# Patient Record
Sex: MALE | Race: WHITE | NOT HISPANIC OR LATINO | Employment: OTHER | URBAN - NONMETROPOLITAN AREA
[De-identification: names, ages, dates, MRNs, and addresses within clinical notes are randomized per-mention and may not be internally consistent; named-entity substitution may affect disease eponyms.]

---

## 2019-05-24 ENCOUNTER — TRANSCRIBE ORDERS (OUTPATIENT)
Dept: ADMINISTRATIVE | Facility: HOSPITAL | Age: 54
End: 2019-05-24

## 2019-05-24 ENCOUNTER — LAB (OUTPATIENT)
Dept: LAB | Facility: HOSPITAL | Age: 54
End: 2019-05-24

## 2019-05-24 DIAGNOSIS — M25.50 MULTIPLE JOINT PAIN: ICD-10-CM

## 2019-05-24 DIAGNOSIS — M25.50 MULTIPLE JOINT PAIN: Primary | ICD-10-CM

## 2019-05-24 LAB
BASOPHILS # BLD AUTO: 0.05 10*3/MM3 (ref 0–0.2)
BASOPHILS NFR BLD AUTO: 0.7 % (ref 0–1.5)
CRP SERPL-MCNC: 1.2 MG/DL (ref 0–1)
DEPRECATED RDW RBC AUTO: 43.4 FL (ref 37–54)
EOSINOPHIL # BLD AUTO: 0.33 10*3/MM3 (ref 0–0.4)
EOSINOPHIL NFR BLD AUTO: 4.3 % (ref 0.3–6.2)
ERYTHROCYTE [DISTWIDTH] IN BLOOD BY AUTOMATED COUNT: 12.9 % (ref 12.3–15.4)
ERYTHROCYTE [SEDIMENTATION RATE] IN BLOOD: 20 MM/HR (ref 0–15)
HCT VFR BLD AUTO: 42 % (ref 37.5–51)
HGB BLD-MCNC: 14.1 G/DL (ref 13–17.7)
IMM GRANULOCYTES # BLD AUTO: 0.01 10*3/MM3 (ref 0–0.05)
IMM GRANULOCYTES NFR BLD AUTO: 0.1 % (ref 0–0.5)
LYMPHOCYTES # BLD AUTO: 2.57 10*3/MM3 (ref 0.7–3.1)
LYMPHOCYTES NFR BLD AUTO: 33.5 % (ref 19.6–45.3)
MCH RBC QN AUTO: 30.9 PG (ref 26.6–33)
MCHC RBC AUTO-ENTMCNC: 33.6 G/DL (ref 31.5–35.7)
MCV RBC AUTO: 91.9 FL (ref 79–97)
MONOCYTES # BLD AUTO: 0.81 10*3/MM3 (ref 0.1–0.9)
MONOCYTES NFR BLD AUTO: 10.6 % (ref 5–12)
NEUTROPHILS # BLD AUTO: 3.9 10*3/MM3 (ref 1.7–7)
NEUTROPHILS NFR BLD AUTO: 50.8 % (ref 42.7–76)
NRBC BLD AUTO-RTO: 0 /100 WBC (ref 0–0.2)
PLATELET # BLD AUTO: 283 10*3/MM3 (ref 140–450)
PMV BLD AUTO: 9.2 FL (ref 6–12)
RBC # BLD AUTO: 4.57 10*6/MM3 (ref 4.14–5.8)
RHEUMATOID FACT SERPL-ACNC: NEGATIVE [IU]/ML
URATE SERPL-MCNC: 6.7 MG/DL (ref 2.5–8.5)
WBC NRBC COR # BLD: 7.67 10*3/MM3 (ref 3.4–10.8)

## 2019-05-24 PROCEDURE — 81374 HLA I TYPING 1 ANTIGEN LR: CPT

## 2019-05-24 PROCEDURE — 86235 NUCLEAR ANTIGEN ANTIBODY: CPT

## 2019-05-24 PROCEDURE — 86140 C-REACTIVE PROTEIN: CPT

## 2019-05-24 PROCEDURE — 86225 DNA ANTIBODY NATIVE: CPT

## 2019-05-24 PROCEDURE — 84550 ASSAY OF BLOOD/URIC ACID: CPT

## 2019-05-24 PROCEDURE — 85651 RBC SED RATE NONAUTOMATED: CPT

## 2019-05-24 PROCEDURE — 86618 LYME DISEASE ANTIBODY: CPT

## 2019-05-24 PROCEDURE — 86200 CCP ANTIBODY: CPT

## 2019-05-24 PROCEDURE — 86430 RHEUMATOID FACTOR TEST QUAL: CPT

## 2019-05-24 PROCEDURE — 85025 COMPLETE CBC W/AUTO DIFF WBC: CPT

## 2019-05-24 PROCEDURE — 36415 COLL VENOUS BLD VENIPUNCTURE: CPT

## 2019-05-25 LAB — B BURGDOR IGG+IGM SER-ACNC: <0.91 ISR (ref 0–0.9)

## 2019-05-27 LAB — CCP IGA+IGG SERPL IA-ACNC: 5 UNITS (ref 0–19)

## 2019-05-28 LAB
CENTROMERE B AB SER-ACNC: <0.2 AI (ref 0–0.9)
CHROMATIN AB SERPL-ACNC: <0.2 AI (ref 0–0.9)
DSDNA AB SER-ACNC: <1 IU/ML (ref 0–9)
ENA JO1 AB SER-ACNC: <0.2 AI (ref 0–0.9)
ENA RNP AB SER-ACNC: <0.2 AI (ref 0–0.9)
ENA SCL70 AB SER-ACNC: 0.2 AI (ref 0–0.9)
ENA SM AB SER-ACNC: <0.2 AI (ref 0–0.9)
ENA SS-A AB SER-ACNC: <0.2 AI (ref 0–0.9)
ENA SS-B AB SER-ACNC: <0.2 AI (ref 0–0.9)
Lab: NORMAL

## 2019-05-31 LAB — HLA-B27 QL NAA+PROBE: NEGATIVE

## 2019-07-03 ENCOUNTER — LAB (OUTPATIENT)
Dept: LAB | Facility: HOSPITAL | Age: 54
End: 2019-07-03

## 2019-07-03 ENCOUNTER — OFFICE VISIT (OUTPATIENT)
Dept: PULMONOLOGY | Facility: CLINIC | Age: 54
End: 2019-07-03

## 2019-07-03 VITALS
BODY MASS INDEX: 39.86 KG/M2 | DIASTOLIC BLOOD PRESSURE: 82 MMHG | HEART RATE: 86 BPM | OXYGEN SATURATION: 92 % | WEIGHT: 263 LBS | HEIGHT: 68 IN | SYSTOLIC BLOOD PRESSURE: 140 MMHG | RESPIRATION RATE: 18 BRPM

## 2019-07-03 DIAGNOSIS — J30.89 OTHER ALLERGIC RHINITIS: ICD-10-CM

## 2019-07-03 DIAGNOSIS — G47.33 OBSTRUCTIVE SLEEP APNEA: ICD-10-CM

## 2019-07-03 DIAGNOSIS — R06.83 SNORING: ICD-10-CM

## 2019-07-03 DIAGNOSIS — J44.9 CHRONIC OBSTRUCTIVE PULMONARY DISEASE, UNSPECIFIED COPD TYPE (HCC): ICD-10-CM

## 2019-07-03 DIAGNOSIS — R06.02 SOB (SHORTNESS OF BREATH): Primary | ICD-10-CM

## 2019-07-03 DIAGNOSIS — G47.19 EXCESSIVE DAYTIME SLEEPINESS: ICD-10-CM

## 2019-07-03 DIAGNOSIS — E66.01 MORBID OBESITY, UNSPECIFIED OBESITY TYPE (HCC): ICD-10-CM

## 2019-07-03 DIAGNOSIS — J44.9 ASTHMA WITH COPD (CHRONIC OBSTRUCTIVE PULMONARY DISEASE) (HCC): ICD-10-CM

## 2019-07-03 DIAGNOSIS — R06.02 SHORTNESS OF BREATH: Primary | ICD-10-CM

## 2019-07-03 DIAGNOSIS — J41.0 CHRONIC BRONCHITIS, SIMPLE (HCC): ICD-10-CM

## 2019-07-03 DIAGNOSIS — Z87.891 PERSONAL HISTORY OF TOBACCO USE, PRESENTING HAZARDS TO HEALTH: ICD-10-CM

## 2019-07-03 PROCEDURE — 86003 ALLG SPEC IGE CRUDE XTRC EA: CPT

## 2019-07-03 PROCEDURE — 94726 PLETHYSMOGRAPHY LUNG VOLUMES: CPT | Performed by: INTERNAL MEDICINE

## 2019-07-03 PROCEDURE — 82785 ASSAY OF IGE: CPT

## 2019-07-03 PROCEDURE — 94729 DIFFUSING CAPACITY: CPT | Performed by: INTERNAL MEDICINE

## 2019-07-03 PROCEDURE — 99245 OFF/OP CONSLTJ NEW/EST HI 55: CPT | Performed by: INTERNAL MEDICINE

## 2019-07-03 PROCEDURE — 36415 COLL VENOUS BLD VENIPUNCTURE: CPT

## 2019-07-03 PROCEDURE — 94060 EVALUATION OF WHEEZING: CPT | Performed by: INTERNAL MEDICINE

## 2019-07-03 PROCEDURE — 95012 NITRIC OXIDE EXP GAS DETER: CPT | Performed by: INTERNAL MEDICINE

## 2019-07-03 RX ORDER — METHYLPREDNISOLONE 4 MG/1
TABLET ORAL
COMMUNITY
Start: 2019-06-28 | End: 2021-03-01

## 2019-07-03 RX ORDER — LOSARTAN POTASSIUM AND HYDROCHLOROTHIAZIDE 25; 100 MG/1; MG/1
TABLET ORAL
COMMUNITY
Start: 2019-06-17 | End: 2021-11-01

## 2019-07-03 RX ORDER — ZOSTER VACCINE RECOMBINANT, ADJUVANTED 50 MCG/0.5
KIT INTRAMUSCULAR
Status: ON HOLD | COMMUNITY
Start: 2019-06-19 | End: 2021-11-03

## 2019-07-03 RX ORDER — HEPATITIS A VACCINE 1440 [IU]/ML
INJECTION, SUSPENSION INTRAMUSCULAR
Status: ON HOLD | COMMUNITY
Start: 2019-06-19 | End: 2021-11-03

## 2019-07-03 RX ORDER — FLUTICASONE PROPIONATE 50 MCG
SPRAY, SUSPENSION (ML) NASAL
COMMUNITY
Start: 2019-06-17 | End: 2019-10-15 | Stop reason: SDUPTHER

## 2019-07-03 RX ORDER — INDOMETHACIN 50 MG/1
50 CAPSULE ORAL
COMMUNITY
Start: 2019-06-28 | End: 2021-05-11

## 2019-07-03 RX ORDER — AMLODIPINE BESYLATE 10 MG/1
10 TABLET ORAL DAILY
COMMUNITY
Start: 2019-06-17

## 2019-07-03 RX ORDER — ATORVASTATIN CALCIUM 40 MG/1
TABLET, FILM COATED ORAL
COMMUNITY
Start: 2019-06-20 | End: 2021-05-11

## 2019-07-03 RX ORDER — ALBUTEROL SULFATE 90 UG/1
AEROSOL, METERED RESPIRATORY (INHALATION)
COMMUNITY
Start: 2019-05-17 | End: 2019-10-15 | Stop reason: SDUPTHER

## 2019-07-03 RX ORDER — DICLOFENAC SODIUM 75 MG/1
TABLET, DELAYED RELEASE ORAL
COMMUNITY
Start: 2019-06-17 | End: 2021-03-01 | Stop reason: SDDI

## 2019-07-03 RX ORDER — ASPIRIN 81 MG/1
TABLET ORAL
COMMUNITY
Start: 2019-06-17 | End: 2021-05-11

## 2019-07-03 NOTE — PROGRESS NOTES
"CONSULT NOTE    Requested by:   Margarita Parra*   Margarita Parra MD      Chief Complaint   Patient presents with   • Consult   • COPD   • Shortness of Breath       Subjective:  Darci Vargas is a 53 y.o. male.     History of Present Illness   Patient comes in today for evaluation of shortness of breath. Patient says that for the past few years, he has been noticing that his exercise tolerance has been declining. The patient has had days when walking any significant distance is difficult to do without stopping in the middle, to catch his breath.     Patient also complains of some cough and phlegm production that occurs on most mornings out of the week, for most weeks out of the month, for the past few years. Patient used to smoke 1-2 pack per day for the past 38 years and quit smoking in January 2019.     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     He also worsens when weather changes or when it is hot and humid.     He was admitted to [facility] in January for pneumonia.     He says that despite using Breo and Incruse, he has occasional \"flare up\". He also uses Ventolin 4-5 times per week or so.    His mother also had asthma.     Upon questioning, he is complaining of sleep disturbance. Patient says that for the past few years, he has had trouble with snoring. Patient has also been told that he sometimes quits breathing at night.       Patient says that he feels tired in the morning after waking up. he is also complaining of occasionally feeling sleepy while watching TV and sometimes while reading a book.    he is not complaining of occasional headaches.    Patient's sleep schedule was reviewed. he drinks 4-5 cups/cans of caffeinated drinks per day.     he does have a family history of ALLEGRA, in his father.     Patient is under management for hypertension.     Patient says that he has been using the prescribed nasal sprays on a regular basis but continues to have worsening " "nasal congestion as well as occasional runny nose.    The following portions of the patient's history were reviewed and updated as appropriate: allergies, current medications, past family history, past medical history, past social history and past surgical history.    Review of Systems   Constitutional: Positive for fatigue. Negative for chills and fever.   HENT: Positive for rhinorrhea, sinus pressure and sneezing. Negative for sore throat.    Respiratory: Positive for cough, shortness of breath and wheezing. Negative for chest tightness.    Cardiovascular: Positive for leg swelling. Negative for chest pain and palpitations.   Psychiatric/Behavioral: Positive for sleep disturbance.   All other systems reviewed and are negative.      Past Medical History:   Diagnosis Date   • COPD (chronic obstructive pulmonary disease) (CMS/[facility])    • High cholesterol    • Hypertension    • Pneumonia    • Tobacco abuse        Social History     Tobacco Use   • Smoking status: Former Smoker     Packs/day: 2.00     Types: Cigarettes   • Tobacco comment: started smoking at 15 years of age    Substance Use Topics   • Alcohol use: No     Frequency: Never         Objective:  Visit Vitals  /82   Pulse 86   Resp 18   Ht 172.7 cm (68\")   Wt 119 kg (263 lb)   SpO2 92%   BMI 39.99 kg/m²       Physical Exam   Constitutional: He is oriented to person, place, and time. He appears well-developed and well-nourished.   HENT:   Head: Atraumatic.   Crowded Oropharynx.    Eyes: EOM are normal. Pupils are equal, round, and reactive to light.   Neck: No JVD present. No tracheal deviation present. No thyromegaly present.   Increased adipose tissue.    Cardiovascular: Normal rate and regular rhythm.   Pulmonary/Chest: Effort normal and breath sounds normal. No respiratory distress. He has no wheezes.   Somewhat hyperresonant to percussion.  Mild scattered wheezing noted.   Musculoskeletal: Normal range of motion. He exhibits no edema.   Gait was " normal.   Neurological: He is alert and oriented to person, place, and time.   Skin: Skin is warm and dry.   Psychiatric: He has a normal mood and affect. His behavior is normal.   Vitals reviewed.      Assessment/Plan:  Darci was seen today for consult, copd and shortness of breath.    Diagnoses and all orders for this visit:    Shortness of breath    Chronic obstructive pulmonary disease, unspecified COPD type (CMS/[facility])    Chronic bronchitis, simple (CMS/[facility])    Personal history of tobacco use, presenting hazards to health    Morbid obesity, unspecified obesity type (CMS/[facility])    Snoring    Obstructive sleep apnea    Excessive daytime sleepiness    Asthma with COPD (chronic obstructive pulmonary disease) (CMS/[facility])        Return in about 10 weeks (around 9/11/2019) for Recheck, Labs, Give pt sleep questionairre, Sleep study, For Rebecca.    DISCUSSION(if any):  I have reviewed the patient's imaging studies and shared them with him. Showed patchy bibasilar opacities.    Last CXRay from April 2019 showed NAPD.    I have also reviewed his last primary care provider's office note that mentions shortness of breath and recent COPD exacerbation secondary to rhinovirus.  He was started on Breo and Incruse, as per primary care provider's note.     ===========================  ===========================    FeNO level was 21 today.    Peak flow was 250 LPM today.    PFTs were reviewed.  Severe COPD noted.    Laboratory workup was also reviewed which showed   Lab Results   Component Value Date    EOSABS 0.33 05/24/2019      ===========================  ===========================    Orders as above.    Based on his symptoms it appears that he has asthma with COPD syndrome.  This may have at least some impact on his management in the future.    He was advised to use his nasal spray twice a day.    IgE/RAST panel has been ordered.     Patient was given education and demonstration on how to use the medicine.     Side effects, of  prescribed medicines, discussed.    Patient was also instructed on compliance and adherence with instructions.     I have discussed the need to stay quit from smoking.    Patient was given reading material, as appropriate.     Patient was asked to call with any concerns.     Patient will be followed clinically to assess for response to treatment and further recommendations will be made, based on response.    Sleep questionnaire was provided to the patient    The pathophysiology of sleep apnea was discussed, with him.     We will encourage him to schedule the sleep study soon.     The patient will definitely need to be considered for an in lab study due to COPD among other reasons.  It should be noted that a home sleep study is likely to underestimate the true AHI and is unable to assess sleep stages and abnormal sleep behaviors etc. The patient has understood.    The patient is agreeable to try CPAP/BiPAP, if needed.     Patient was educated on good sleep hygiene measures and voiced understanding of the same.     Patient was given reading material regarding sleep apnea.      Dictated utilizing Dragon dictation.    This document was electronically signed by Jade Contreras MD on 07/03/19 at 10:01 AM

## 2019-07-07 LAB
A ALTERNATA IGE QN: <0.1 KU/L
A FUMIGATUS IGE QN: <0.1 KU/L
AMER ROACH IGE QN: <0.1 KU/L
BAHIA GRASS IGE QN: <0.1 KU/L
BERMUDA GRASS IGE QN: <0.1 KU/L
BOXELDER IGE QN: <0.1 KU/L
C HERBARUM IGE QN: <0.1 KU/L
CAT DANDER IGG QN: <0.1 KU/L
CMN PIGWEED IGE QN: <0.1 KU/L
COMMON RAGWEED IGE QN: <0.1 KU/L
CONV CLASS DESCRIPTION: NORMAL
D FARINAE IGE QN: <0.1 KU/L
D PTERONYSS IGE QN: <0.1 KU/L
DOG DANDER IGE QN: <0.1 KU/L
ENGL PLANTAIN IGE QN: <0.1 KU/L
HAZELNUT POLN IGE QN: <0.1 KU/L
JOHNSON GRASS IGE QN: <0.1 KU/L
KENT BLUE GRASS IGE QN: <0.1 KU/L
M RACEMOSUS IGE QN: <0.1 KU/L
MT JUNIPER IGE QN: <0.1 KU/L
MUGWORT IGE QN: <0.1 KU/L
NETTLE IGE QN: <0.1 KU/L
P NOTATUM IGE QN: <0.1 KU/L
S BOTRYOSUM IGE QN: <0.1 KU/L
SHEEP SORREL IGE QN: <0.1 KU/L
SWEET GUM IGE QN: <0.1 KU/L
T011-IGE MAPLE LEAF SYCAMORE: <0.1 KU/L
TOTAL IGE SMQN RAST: 12 IU/ML (ref 6–495)
WHITE ELM IGE QN: <0.1 KU/L
WHITE HICKORY IGE QN: <0.1 KU/L
WHITE MULBERRY IGE QN: <0.1 KU/L
WHITE OAK IGE QN: <0.1 KU/L

## 2019-07-19 ENCOUNTER — HOSPITAL ENCOUNTER (OUTPATIENT)
Dept: SLEEP MEDICINE | Facility: HOSPITAL | Age: 54
Discharge: HOME | End: 2019-07-19
Admitting: INTERNAL MEDICINE

## 2019-07-19 DIAGNOSIS — R06.83 SNORING: ICD-10-CM

## 2019-07-19 DIAGNOSIS — G47.19 EXCESSIVE DAYTIME SLEEPINESS: ICD-10-CM

## 2019-07-19 DIAGNOSIS — J44.9 CHRONIC OBSTRUCTIVE PULMONARY DISEASE, UNSPECIFIED COPD TYPE (HCC): ICD-10-CM

## 2019-07-19 DIAGNOSIS — G47.33 OBSTRUCTIVE SLEEP APNEA: ICD-10-CM

## 2019-07-19 DIAGNOSIS — E66.01 MORBID OBESITY, UNSPECIFIED OBESITY TYPE (HCC): ICD-10-CM

## 2019-07-19 PROCEDURE — 95810 POLYSOM 6/> YRS 4/> PARAM: CPT

## 2019-07-19 PROCEDURE — 95810 POLYSOM 6/> YRS 4/> PARAM: CPT | Performed by: INTERNAL MEDICINE

## 2019-10-15 ENCOUNTER — OFFICE VISIT (OUTPATIENT)
Dept: PULMONOLOGY | Facility: CLINIC | Age: 54
End: 2019-10-15

## 2019-10-15 VITALS
HEIGHT: 68 IN | SYSTOLIC BLOOD PRESSURE: 144 MMHG | HEART RATE: 72 BPM | BODY MASS INDEX: 37.44 KG/M2 | DIASTOLIC BLOOD PRESSURE: 96 MMHG | WEIGHT: 247 LBS | RESPIRATION RATE: 20 BRPM | OXYGEN SATURATION: 91 %

## 2019-10-15 DIAGNOSIS — E66.01 MORBID OBESITY, UNSPECIFIED OBESITY TYPE (HCC): ICD-10-CM

## 2019-10-15 DIAGNOSIS — J44.9 CHRONIC OBSTRUCTIVE PULMONARY DISEASE, UNSPECIFIED COPD TYPE (HCC): ICD-10-CM

## 2019-10-15 DIAGNOSIS — R06.02 SHORTNESS OF BREATH: Primary | ICD-10-CM

## 2019-10-15 DIAGNOSIS — G47.33 OBSTRUCTIVE SLEEP APNEA: ICD-10-CM

## 2019-10-15 PROCEDURE — 99214 OFFICE O/P EST MOD 30 MIN: CPT | Performed by: NURSE PRACTITIONER

## 2019-10-15 RX ORDER — FOLIC ACID 1 MG/1
1 TABLET ORAL DAILY
COMMUNITY
End: 2021-05-11

## 2019-10-15 RX ORDER — PREDNISONE 10 MG/1
10 TABLET ORAL DAILY
COMMUNITY
End: 2021-05-11

## 2019-10-15 RX ORDER — FLUTICASONE PROPIONATE 50 MCG
2 SPRAY, SUSPENSION (ML) NASAL DAILY
Qty: 1 BOTTLE | Refills: 5 | Status: SHIPPED | OUTPATIENT
Start: 2019-10-15 | End: 2020-06-19

## 2019-10-15 RX ORDER — ALBUTEROL SULFATE 90 UG/1
2 AEROSOL, METERED RESPIRATORY (INHALATION) EVERY 6 HOURS PRN
Qty: 1 INHALER | Refills: 5 | Status: SHIPPED | OUTPATIENT
Start: 2019-10-15 | End: 2020-08-10 | Stop reason: SDUPTHER

## 2019-10-15 NOTE — PROGRESS NOTES
"Chief Complaint   Patient presents with   • Follow-up   • Shortness of Breath         Subjective   Darci Vargas is a 54 y.o. male.     History of Present Illness   The patient comes in today for follow-up of COPD and obstructive sleep apnea.    He is using Breo and Incruse on a daily basis.  He uses the rescue inhaler 1-2 times per week but states he is using it less now than he was previously.    He denies any respiratory illness.    He uses oxygen at 2 L/min nearly continuously. He has been on oxygen since January 2019 when he was discharged from the hospital.      I reviewed his sleep study and discussed the results with him.  His sleep study revealed moderate obstructive sleep apnea with an apnea hypopnea index of 28/h.  The apnea hypopnea index was worse in REM sleep.  Unfortunately the patient refused CPAP when he was contacted regarding set up.    The patient states he knows himself best and he knows he will not use this machine.  He states he cannot stand anything on his face.    He takes prednisone daily due to arthritis.    He uses Flonase on a daily basis.    He quit smoking January 2019.    The following portions of the patient's history were reviewed and updated as appropriate: allergies, current medications, past family history, past medical history, past social history and past surgical history.    Review of Systems   Constitutional: Positive for chills and fever. Negative for fatigue.   HENT: Positive for rhinorrhea, sinus pressure and sinus pain. Negative for sneezing and sore throat.    Respiratory: Negative for cough, shortness of breath and wheezing.    Psychiatric/Behavioral: Negative for sleep disturbance.       Objective   Visit Vitals  /96   Pulse 72   Resp 20   Ht 172.7 cm (68\")   Wt 112 kg (247 lb)   SpO2 91% Comment: resting on room air   BMI 37.56 kg/m²     Physical Exam   Constitutional: He is oriented to person, place, and time.   HENT:   Head: Normocephalic and atraumatic. "   Crowded oropharynx.    Eyes: EOM are normal.   Neck: Neck supple.   Cardiovascular: Normal rate and regular rhythm.   Pulmonary/Chest: Effort normal. No respiratory distress.   Somewhat decreased A/E without wheezing noted.   Musculoskeletal: He exhibits no edema.   Gait normal.   Neurological: He is alert and oriented to person, place, and time.   Psychiatric: He has a normal mood and affect.   Vitals reviewed.          Assessment/Plan   Darci was seen today for follow-up and shortness of breath.    Diagnoses and all orders for this visit:    Shortness of breath    Chronic obstructive pulmonary disease, unspecified COPD type (CMS/HCC)    Morbid obesity, unspecified obesity type (CMS/HCC)    Obstructive sleep apnea    Other orders  -     albuterol sulfate  (90 Base) MCG/ACT inhaler; Inhale 2 puffs Every 6 (Six) Hours As Needed for Wheezing.  -     BREO ELLIPTA 200-25 MCG/INH inhaler; Inhale 1 puff Daily.  -     fluticasone (FLONASE) 50 MCG/ACT nasal spray; 2 sprays into the nostril(s) as directed by provider Daily.  -     INCRUSE ELLIPTA 62.5 MCG/INH aerosol powder ; Inhale 1 puff Daily.           Return in about 5 months (around 3/15/2020) for Recheck, For Dr. Contreras.    DISCUSSION (if any):  I reviewed his labs.  RAST was normal.  IgE 12.  Absolute eosinophils were 330 in May 2019.    No change to the current medications has been made.  He should continue using Breo, Incruse, and the rescue medication as needed.    Compliance with medications stressed.     Side effects of prescribed medications discussed with the patient    The patient belongs to the risk group for which lung cancer screening has been recommended.  This will need to be ordered at his next visit due to his long-standing smoking history.    We have discussed the risks of not treating obstructive sleep apnea including increased risk of heart attack and stroke.  The patient verbalizes these risks and states he will continue to use the oxygen  at night but he refuses CPAP therapy altogether.      Dictated utilizing Dragon dictation.    This document was electronically signed by ALVAREZ Guerrero October 15, 2019  10:36 AM

## 2020-05-28 ENCOUNTER — OFFICE VISIT (OUTPATIENT)
Dept: PULMONOLOGY | Facility: CLINIC | Age: 55
End: 2020-05-28

## 2020-05-28 VITALS
OXYGEN SATURATION: 96 % | RESPIRATION RATE: 18 BRPM | BODY MASS INDEX: 41.37 KG/M2 | SYSTOLIC BLOOD PRESSURE: 122 MMHG | HEART RATE: 82 BPM | TEMPERATURE: 98 F | WEIGHT: 273 LBS | HEIGHT: 68 IN | DIASTOLIC BLOOD PRESSURE: 82 MMHG

## 2020-05-28 DIAGNOSIS — J44.9 CHRONIC OBSTRUCTIVE PULMONARY DISEASE, UNSPECIFIED COPD TYPE (HCC): ICD-10-CM

## 2020-05-28 DIAGNOSIS — Z87.891 PERSONAL HISTORY OF TOBACCO USE, PRESENTING HAZARDS TO HEALTH: ICD-10-CM

## 2020-05-28 DIAGNOSIS — E66.01 MORBID OBESITY, UNSPECIFIED OBESITY TYPE (HCC): ICD-10-CM

## 2020-05-28 DIAGNOSIS — G47.33 OBSTRUCTIVE SLEEP APNEA: ICD-10-CM

## 2020-05-28 DIAGNOSIS — J44.9 ASTHMA WITH COPD (CHRONIC OBSTRUCTIVE PULMONARY DISEASE) (HCC): ICD-10-CM

## 2020-05-28 DIAGNOSIS — R06.02 SHORTNESS OF BREATH: Primary | ICD-10-CM

## 2020-05-28 PROCEDURE — 99214 OFFICE O/P EST MOD 30 MIN: CPT | Performed by: INTERNAL MEDICINE

## 2020-05-28 RX ORDER — LEFLUNOMIDE 10 MG/1
TABLET ORAL
COMMUNITY
Start: 2020-05-18 | End: 2021-03-01 | Stop reason: SINTOL

## 2020-06-04 ENCOUNTER — APPOINTMENT (OUTPATIENT)
Dept: CT IMAGING | Facility: HOSPITAL | Age: 55
End: 2020-06-04

## 2020-06-12 ENCOUNTER — HOSPITAL ENCOUNTER (OUTPATIENT)
Dept: CT IMAGING | Facility: HOSPITAL | Age: 55
Discharge: HOME | End: 2020-06-12
Admitting: INTERNAL MEDICINE

## 2020-06-12 DIAGNOSIS — Z87.891 PERSONAL HISTORY OF TOBACCO USE, PRESENTING HAZARDS TO HEALTH: ICD-10-CM

## 2020-06-12 PROCEDURE — G0297 LDCT FOR LUNG CA SCREEN: HCPCS

## 2020-06-19 RX ORDER — FLUTICASONE PROPIONATE 50 MCG
SPRAY, SUSPENSION (ML) NASAL
Qty: 16 G | Refills: 5 | Status: SHIPPED | OUTPATIENT
Start: 2020-06-19

## 2020-08-07 ENCOUNTER — TELEPHONE (OUTPATIENT)
Dept: PULMONOLOGY | Facility: CLINIC | Age: 55
End: 2020-08-07

## 2020-08-10 ENCOUNTER — TRANSCRIBE ORDERS (OUTPATIENT)
Dept: LAB | Facility: HOSPITAL | Age: 55
End: 2020-08-10

## 2020-08-10 ENCOUNTER — LAB (OUTPATIENT)
Dept: LAB | Facility: HOSPITAL | Age: 55
End: 2020-08-10

## 2020-08-10 ENCOUNTER — OFFICE VISIT (OUTPATIENT)
Dept: PULMONOLOGY | Facility: CLINIC | Age: 55
End: 2020-08-10

## 2020-08-10 VITALS
OXYGEN SATURATION: 96 % | SYSTOLIC BLOOD PRESSURE: 124 MMHG | TEMPERATURE: 97.3 F | HEART RATE: 69 BPM | DIASTOLIC BLOOD PRESSURE: 74 MMHG | HEIGHT: 68 IN | RESPIRATION RATE: 16 BRPM | WEIGHT: 279 LBS | BODY MASS INDEX: 42.28 KG/M2

## 2020-08-10 DIAGNOSIS — J44.9 CHRONIC OBSTRUCTIVE PULMONARY DISEASE, UNSPECIFIED COPD TYPE (HCC): ICD-10-CM

## 2020-08-10 DIAGNOSIS — M06.09 RHEUMATOID ARTHRITIS OF MULTIPLE SITES WITHOUT RHEUMATOID FACTOR (HCC): ICD-10-CM

## 2020-08-10 DIAGNOSIS — G47.33 OBSTRUCTIVE SLEEP APNEA: ICD-10-CM

## 2020-08-10 DIAGNOSIS — R06.02 SHORTNESS OF BREATH: Primary | ICD-10-CM

## 2020-08-10 DIAGNOSIS — E66.01 MORBID OBESITY, UNSPECIFIED OBESITY TYPE (HCC): ICD-10-CM

## 2020-08-10 LAB
ALBUMIN SERPL-MCNC: 4.1 G/DL (ref 3.5–5.2)
ALBUMIN/GLOB SERPL: 1.4 G/DL
ALP SERPL-CCNC: 85 U/L (ref 39–117)
ALT SERPL W P-5'-P-CCNC: 22 U/L (ref 1–41)
ANION GAP SERPL CALCULATED.3IONS-SCNC: 7.7 MMOL/L (ref 5–15)
AST SERPL-CCNC: 21 U/L (ref 1–40)
BASOPHILS # BLD AUTO: 0.04 10*3/MM3 (ref 0–0.2)
BASOPHILS NFR BLD AUTO: 0.4 % (ref 0–1.5)
BILIRUB SERPL-MCNC: 0.7 MG/DL (ref 0–1.2)
BUN SERPL-MCNC: 13 MG/DL (ref 6–20)
BUN/CREAT SERPL: 14.8 (ref 7–25)
CALCIUM SPEC-SCNC: 9.7 MG/DL (ref 8.6–10.5)
CHLORIDE SERPL-SCNC: 92 MMOL/L (ref 98–107)
CO2 SERPL-SCNC: 30.3 MMOL/L (ref 22–29)
CREAT SERPL-MCNC: 0.88 MG/DL (ref 0.76–1.27)
DEPRECATED RDW RBC AUTO: 48.1 FL (ref 37–54)
EOSINOPHIL # BLD AUTO: 0.18 10*3/MM3 (ref 0–0.4)
EOSINOPHIL NFR BLD AUTO: 2 % (ref 0.3–6.2)
ERYTHROCYTE [DISTWIDTH] IN BLOOD BY AUTOMATED COUNT: 13.6 % (ref 12.3–15.4)
GFR SERPL CREATININE-BSD FRML MDRD: 90 ML/MIN/1.73
GLOBULIN UR ELPH-MCNC: 2.9 GM/DL
GLUCOSE SERPL-MCNC: 82 MG/DL (ref 65–99)
HCT VFR BLD AUTO: 45.5 % (ref 37.5–51)
HGB BLD-MCNC: 15 G/DL (ref 13–17.7)
IMM GRANULOCYTES # BLD AUTO: 0.02 10*3/MM3 (ref 0–0.05)
IMM GRANULOCYTES NFR BLD AUTO: 0.2 % (ref 0–0.5)
LYMPHOCYTES # BLD AUTO: 2.19 10*3/MM3 (ref 0.7–3.1)
LYMPHOCYTES NFR BLD AUTO: 23.9 % (ref 19.6–45.3)
MCH RBC QN AUTO: 31.9 PG (ref 26.6–33)
MCHC RBC AUTO-ENTMCNC: 33 G/DL (ref 31.5–35.7)
MCV RBC AUTO: 96.8 FL (ref 79–97)
MONOCYTES # BLD AUTO: 0.86 10*3/MM3 (ref 0.1–0.9)
MONOCYTES NFR BLD AUTO: 9.4 % (ref 5–12)
NEUTROPHILS NFR BLD AUTO: 5.86 10*3/MM3 (ref 1.7–7)
NEUTROPHILS NFR BLD AUTO: 64.1 % (ref 42.7–76)
NRBC BLD AUTO-RTO: 0 /100 WBC (ref 0–0.2)
PLATELET # BLD AUTO: 295 10*3/MM3 (ref 140–450)
PMV BLD AUTO: 9 FL (ref 6–12)
POTASSIUM SERPL-SCNC: 3.4 MMOL/L (ref 3.5–5.2)
PROT SERPL-MCNC: 7 G/DL (ref 6–8.5)
RBC # BLD AUTO: 4.7 10*6/MM3 (ref 4.14–5.8)
SODIUM SERPL-SCNC: 130 MMOL/L (ref 136–145)
WBC # BLD AUTO: 9.15 10*3/MM3 (ref 3.4–10.8)

## 2020-08-10 PROCEDURE — 80053 COMPREHEN METABOLIC PANEL: CPT

## 2020-08-10 PROCEDURE — 36415 COLL VENOUS BLD VENIPUNCTURE: CPT

## 2020-08-10 PROCEDURE — 95012 NITRIC OXIDE EXP GAS DETER: CPT | Performed by: NURSE PRACTITIONER

## 2020-08-10 PROCEDURE — 85025 COMPLETE CBC W/AUTO DIFF WBC: CPT

## 2020-08-10 PROCEDURE — 99214 OFFICE O/P EST MOD 30 MIN: CPT | Performed by: NURSE PRACTITIONER

## 2020-08-10 RX ORDER — ALBUTEROL SULFATE 90 UG/1
2 AEROSOL, METERED RESPIRATORY (INHALATION) EVERY 6 HOURS PRN
Qty: 1 INHALER | Refills: 5 | Status: SHIPPED | OUTPATIENT
Start: 2020-08-10

## 2020-08-10 RX ORDER — MELATONIN
1000 DAILY
COMMUNITY
End: 2021-05-11

## 2020-08-10 NOTE — PROGRESS NOTES
"Chief Complaint   Patient presents with   • Follow-up   • Shortness of Breath         Subjective   Darci Vargas is a 54 y.o. male.     History of Present Illness   Patient comes today for follow up of shortness of breath and COPD.     Symptoms have been stable since the last clinic visit. Patient reports no recent exacerbations.     Patient is using medications, as prescribed. He is using Incruse and Breo daily. He uses the rescue inhaler 1-2 times a day on average however, some days he does not use it at all.     He is using Flonase daily and feels this medication is working well.    Exercise tolerance has also remained stable.     Quit smoking 2 years ago.    He is on prednisone as needed for arthritis.    I reviewed his sleep study and discussed the results with him.  The sleep study reveals moderate sleep apnea with an apnea hypopnea index of 28/h.  His apnea hypopnea index was much worse in REM sleep at 66/h.    He was ordered BiPAP but when he talked to the DME company he was told that the masks and hoses would be an extra cost and he would have to pay for those supplies out of his pocket and he states he cannot afford this.    The following portions of the patient's history were reviewed and updated as appropriate: allergies, current medications, past family history, past medical history, past social history and past surgical history.    Review of Systems   HENT: Positive for sneezing. Negative for sinus pressure and sore throat.    Respiratory: Positive for cough. Negative for chest tightness, shortness of breath and wheezing.        Objective   Visit Vitals  /74   Pulse 69   Temp 97.3 °F (36.3 °C)   Resp 16   Ht 172.7 cm (67.99\")   Wt 127 kg (279 lb)   SpO2 96%   BMI 42.43 kg/m²       Physical Exam   Constitutional: He is oriented to person, place, and time.   HENT:   Head: Normocephalic and atraumatic.   Crowded oropharynx.   Eyes: EOM are normal.   Neck: Neck supple.   Cardiovascular: Normal rate and " regular rhythm.   Pulmonary/Chest: Effort normal. No respiratory distress.   Somewhat decreased A/E without wheezing noted.   Abdominal:   Obese.   Musculoskeletal: He exhibits no edema.   Gait slow.   Neurological: He is alert and oriented to person, place, and time.   Psychiatric: He has a normal mood and affect.   Vitals reviewed.          Assessment/Plan   Darci was seen today for follow-up and shortness of breath.    Diagnoses and all orders for this visit:    Shortness of breath    Chronic obstructive pulmonary disease, unspecified COPD type (CMS/HCC)    Obstructive sleep apnea  -     BIPAP / CPAP Adjustment    Morbid obesity, unspecified obesity type (CMS/HCC)    Other orders  -     BREO ELLIPTA 200-25 MCG/INH inhaler; Inhale 1 puff Daily.  -     albuterol sulfate  (90 Base) MCG/ACT inhaler; Inhale 2 puffs Every 6 (Six) Hours As Needed for Wheezing.  -     tiotropium bromide monohydrate (Spiriva Respimat) 2.5 MCG/ACT aerosol solution inhaler; Inhale 2 puffs Daily.           Return for keep appt in December with Doc.    DISCUSSION (if any):  FeNO 18 ppb.    Peak flow 310 LPM.    No change to the current medications has been made. He needs to continue using Breo and the rescue medication as directed.  I will change him from Incruse to Spiriva due to insurance coverage.    He has benefited from Flonase and I have asked him to continue using this medicine on a daily basis.    Compliance with medications stressed.     Side effects of prescribed medications discussed with the patient.    The patient belongs to the risk group for which lung cancer screening has been recommended. No pulmonary nodules on recent CT. Centrilobular emphysema noted. He will be du June 2021.    I have discussed with the patient that his supplies for the CPAP machine as well as the CPAP machine should be partially covered by his insurance.  I have suggested we send the order to a different [facility] EHR company than he previously talked with.   The patient is willing to try it if he can afford the co-pay.    He is up-to-date on flu and pneumonia vaccines.    Dictated utilizing Dragon dictation.    This document was electronically signed by ALVAREZ Guerrero August 10, 2020  14:25

## 2020-08-21 DIAGNOSIS — G47.33 OBSTRUCTIVE SLEEP APNEA: Primary | ICD-10-CM

## 2020-08-24 ENCOUNTER — TELEPHONE (OUTPATIENT)
Dept: PULMONOLOGY | Facility: CLINIC | Age: 55
End: 2020-08-24

## 2021-02-23 ENCOUNTER — OFFICE VISIT (OUTPATIENT)
Dept: PULMONOLOGY | Facility: CLINIC | Age: 56
End: 2021-02-23

## 2021-02-23 VITALS
OXYGEN SATURATION: 92 % | BODY MASS INDEX: 41.52 KG/M2 | DIASTOLIC BLOOD PRESSURE: 78 MMHG | RESPIRATION RATE: 16 BRPM | SYSTOLIC BLOOD PRESSURE: 118 MMHG | HEIGHT: 68 IN | HEART RATE: 97 BPM | WEIGHT: 274 LBS

## 2021-02-23 DIAGNOSIS — G47.33 OBSTRUCTIVE SLEEP APNEA: ICD-10-CM

## 2021-02-23 DIAGNOSIS — E66.01 MORBID OBESITY, UNSPECIFIED OBESITY TYPE (HCC): ICD-10-CM

## 2021-02-23 DIAGNOSIS — Z86.16 HISTORY OF COVID-19: ICD-10-CM

## 2021-02-23 DIAGNOSIS — R06.02 SHORTNESS OF BREATH: Primary | ICD-10-CM

## 2021-02-23 DIAGNOSIS — J44.9 CHRONIC OBSTRUCTIVE PULMONARY DISEASE, UNSPECIFIED COPD TYPE (HCC): ICD-10-CM

## 2021-02-23 DIAGNOSIS — Z87.891 PERSONAL HISTORY OF TOBACCO USE, PRESENTING HAZARDS TO HEALTH: ICD-10-CM

## 2021-02-23 PROCEDURE — 99214 OFFICE O/P EST MOD 30 MIN: CPT | Performed by: INTERNAL MEDICINE

## 2021-02-23 RX ORDER — ADALIMUMAB 40MG/0.4ML
KIT SUBCUTANEOUS
COMMUNITY
Start: 2021-02-19 | End: 2021-05-11 | Stop reason: SDDI

## 2021-02-23 RX ORDER — TIOTROPIUM BROMIDE INHALATION SPRAY 3.12 UG/1
2 SPRAY, METERED RESPIRATORY (INHALATION) DAILY
Qty: 3 EACH | Refills: 2 | Status: SHIPPED | OUTPATIENT
Start: 2021-02-23

## 2021-02-23 NOTE — PROGRESS NOTES
"  Chief Complaint   Patient presents with   • Follow-up   • Shortness of Breath       Subjective   Darci Varags is a 55 y.o. male.     History of Present Illness   Patient was evaluated today for follow up of shortness of breath, ALLEGRA and COPD.     Patient says that his symptoms have been stable since the last clinic visit. he reports no recent exacerbations.     Patient is using medications, as prescribed. Exercise tolerance has also remained stable.     Quit smoking 2 years ago.     Patient was evaluated today to discuss the results of Sleep study.     The patient could not afford the CPAP. he continues to have daytime sleepiness & tiredness.     He has had significant issues with bilateral knee pain and back pain.     Also had CoVid in late December and was given \"immune treatment for it\"     The following portions of the patient's history were reviewed and updated as appropriate: allergies, current medications, past family history, past medical history, past social history and past surgical history.    Review of Systems   Constitutional: Negative for chills and fever.   HENT: Negative for rhinorrhea, sinus pressure, sneezing and sore throat.    Respiratory: Positive for cough. Negative for chest tightness, shortness of breath and wheezing.    Psychiatric/Behavioral: Positive for sleep disturbance.       Objective   Visit Vitals  /78   Pulse 97   Resp 16   Ht 172.7 cm (67.99\")   Wt 124 kg (274 lb)   SpO2 92%   BMI 41.67 kg/m²       Physical Exam  Vitals signs reviewed.   Constitutional:       Appearance: He is well-developed.   HENT:      Head: Atraumatic.      Mouth/Throat:      Comments: Oropharynx was crowded.  Neck:      Musculoskeletal: Neck supple.      Comments: Increased adipose tissue noted.  Cardiovascular:      Rate and Rhythm: Normal rate and regular rhythm.   Pulmonary:      Effort: Pulmonary effort is normal. No respiratory distress.      Comments: Somewhat hyperresonant to percussion.  Somewhat " decreased air entry.  Mild scattered wheezing noted.   Musculoskeletal:      Comments: Gait was normal.   Skin:     General: Skin is warm and dry.   Neurological:      Mental Status: He is alert and oriented to person, place, and time.         Assessment/Plan   Diagnoses and all orders for this visit:    1. Shortness of breath (Primary)  -     Pulmonary Function Test; Future    2. Chronic obstructive pulmonary disease, unspecified COPD type (CMS/HCC)  -     Cancel: Overnight Sleep Oximetry Study; Future  -     Pulmonary Function Test; Future  -     Overnight Sleep Oximetry Study; Future    3. Obstructive sleep apnea  -     Cancel: Overnight Sleep Oximetry Study; Future  -     Overnight Sleep Oximetry Study; Future    4. Morbid obesity, unspecified obesity type (CMS/HCC)    5. Personal history of tobacco use, presenting hazards to health  -      CT Chest Low Dose Cancer Screening WO; Future    6. History of COVID-19    Other orders  -     Breo Ellipta 200-25 MCG/INH inhaler; Inhale 1 puff Daily.  Dispense: 180 each; Refill: 2  -     tiotropium bromide monohydrate (Spiriva Respimat) 2.5 MCG/ACT aerosol solution inhaler; Inhale 2 puffs Daily.  Dispense: 3 each; Refill: 2         Return in about 5 months (around 7/23/2021) for Recheck, Imaging, PFT F/U, Overnight P Ox, For Rebecca, ....Also 12 mths w/ Dr. Contreras.    DISCUSSION (if any):  Last CT scan was reviewed in great detail with the patient. Images reviewed personally.   Results for orders placed during the hospital encounter of 06/12/20   CT Chest Low Dose Wo    Narrative PROCEDURE: CT CHEST LOW DOSE WO-     HISTORY: Lung cancer annual screening, asymptomatic, smoker hx w/in last  15 yrs (min. 30 pack-yrs); Z87.891-Personal history of nicotine  dependence     TECHNIQUE: Axial images were obtained from the thoracic inlet through  the upper abdomen per the low-dose protocol. Coronal images were  reformatted. .18 mGy.cm ; CTDI 3.34 mGy.     COMPARISON: None.      FINDINGS: There is centrilobular emphysema. There are no calcified or  noncalcified nodules. There is no airspace disease. The heart is normal  in size. The aorta is normal in caliber. There are no pleural or  pericardial effusions. The visualized upper abdomen is unremarkable.  Bone windows reveal no acute osseous abnormalities.       Impression Lung RADS 1 negative     RECOMMENDATIONS:Annual low-dose chest CT.     This report was finalized on 6/12/2020 2:15 PM by Joshua Priest M.D..     Latest available PFTs were reviewed.  Consistent with severe COPD. Performed in 07/2019.    We have reviewed his pulmonary medications in great detail.    Any needed adjustments to his pulmonary medications, either for clinical or insurance coverage reasons, have been made and are reflected in the orders.    Compliance with medications stressed.     Side effects of prescribed medications discussed with the patient    The patient belongs to the risk group for which lung cancer screening has been recommended. We will try to make arrangements for the same and this will be indicated in June 2021. This has been ordered.    I discussed the results of sleep study with the patient, in detail. I informed him that the apnea hypopnea index was 28 / hr. This was an in lab study. his REM AHI was 66/hr.    I told the patient that he has obstructive sleep apnea based on the sleep study.  The symptoms also suggest the likelihood of obstructive sleep apnea.    Since he can't afford the CPAP, and he has severe COPD and had event related hypoxia, we will order overnight pulse oximetry.       Dictated utilizing Dragon dictation.    This document was electronically signed by Jade Contreras MD on 02/23/21 at 10:18 EST

## 2021-03-01 ENCOUNTER — OFFICE VISIT (OUTPATIENT)
Dept: PSYCHIATRY | Facility: CLINIC | Age: 56
End: 2021-03-01

## 2021-03-01 VITALS
WEIGHT: 280.2 LBS | SYSTOLIC BLOOD PRESSURE: 144 MMHG | DIASTOLIC BLOOD PRESSURE: 90 MMHG | TEMPERATURE: 98.4 F | HEIGHT: 68 IN | RESPIRATION RATE: 18 BRPM | HEART RATE: 84 BPM | BODY MASS INDEX: 42.47 KG/M2

## 2021-03-01 DIAGNOSIS — F41.1 GENERALIZED ANXIETY DISORDER: ICD-10-CM

## 2021-03-01 DIAGNOSIS — F33.0 MILD EPISODE OF RECURRENT MAJOR DEPRESSIVE DISORDER (HCC): Primary | ICD-10-CM

## 2021-03-01 PROCEDURE — 90792 PSYCH DIAG EVAL W/MED SRVCS: CPT | Performed by: NURSE PRACTITIONER

## 2021-03-01 RX ORDER — DULOXETIN HYDROCHLORIDE 60 MG/1
60 CAPSULE, DELAYED RELEASE ORAL DAILY
Qty: 30 CAPSULE | Refills: 2 | Status: SHIPPED | OUTPATIENT
Start: 2021-03-01 | End: 2021-03-01

## 2021-03-01 RX ORDER — DULOXETIN HYDROCHLORIDE 30 MG/1
30 CAPSULE, DELAYED RELEASE ORAL DAILY
Qty: 7 CAPSULE | Refills: 0 | Status: SHIPPED | OUTPATIENT
Start: 2021-03-01 | End: 2021-04-13

## 2021-03-01 RX ORDER — DULOXETIN HYDROCHLORIDE 60 MG/1
60 CAPSULE, DELAYED RELEASE ORAL DAILY
Qty: 30 CAPSULE | Refills: 2 | Status: SHIPPED | OUTPATIENT
Start: 2021-03-09 | End: 2021-05-11 | Stop reason: SDUPTHER

## 2021-03-01 RX ORDER — B-COMPLEX WITH VITAMIN C
TABLET ORAL
COMMUNITY
End: 2021-05-11

## 2021-03-01 NOTE — PROGRESS NOTES
"Chief Complaint  Anxiety, Depression, and Sleeping Problem      Subjective          Darci Vargas presents to [facility] for evaluation of medication management for his anxiety, depression, sleeping difficulties.    History of Present Illness: Patient presents today as a referral from his PCP for initial evaluation.  Patient reports he has been struggling with anxiety and depression for the last few years secondary to his health issues.  Patient reports he has had a dramatic increase in the number and severity of his health issues over the last 5 years.  He is now permanently disabled and has many issues now which he did not have a few years ago.  Patient Dors is \"lots of really big changes in a short amount of time.  Everything is just so different for me now\".  Patient also endorses the recent loss of a very close friend secondary to health issues.  \"I watched what happened to him and it just really affected me.  I do not want that to be me\".  Patient endorses feelings of sadness and loss surrounding both his friend, and his previous way of life.  Patient reports he was used to being able to get up and go, and do anything that he wanted to do.  Now he has to think about everything he does, and is not able to do many of the things he used to enjoy, like hunting and fishing, because he is afraid \"if I get near the water or down and some holler somewhere and cannot get out because I cannot breathe, I might die\".  Patient also endorses issues with his sleep.  He says he does not sleep very well, however does endorse he has untreated obstructive sleep apnea, as well as possible prostate issues and physical pain which often awakens him at night.  Patient denies the presence of any appetite issues.  Patient denies any SI/HI, hallucinations.    Past Psychiatric History: Patient has no known past psychiatric history, including no hospitalizations, no suicide attempts, no instances of " self-harm, and no prior psychiatric medications.    Substance Use/Abuse: Patient reports he is not ex-smoker, having quit in 2018 after being admitted to the hospital secondary to pneumonia and a COPD exacerbation.  Patient denies any alcohol consumption in the last 5 years, and denies any history of illicit substance use.    Past Medical/Developmental History: Patient's past medical history includes RA, OA, pneumonia, hypertension, high cholesterol, and COPD.  Patient has had a cholecystectomy.  No known developmental delay history.    Family Psychiatric History: Family psychiatric history is unknown.    Social History: Patient is originally from [place], [place] and has lived there his whole life.  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  In addition to this for approximately 20 to 25 years the patient installed amarjit and worked as a .  He is now permanently disabled secondary to his physical health issues.  Patient has been  for approximately 25 years.  He does not have any biological children, however has several stepchildren, stepgrandchildren, and step great-grandchildren.  Patient reports his parents are still living and he sees them on a daily basis.  He is the oldest of 3 children, with a younger brother and sister.  He endorses a good relationship with both of his parents and his sister, whom he sees regularly.  Patient says he has a good relationship with his brother, but does not see him very often.  In the past, patient reports he used to enjoy hunting and fishing, and simply being outside.  He reports now for fun he enjoys going yard saleing with his dad and gardening.      Current Medications:   Current Outpatient Medications   Medication Sig Dispense Refill   • albuterol sulfate  (90 Base) MCG/ACT inhaler Inhale 2 puffs Every 6 (Six) Hours As Needed for Wheezing. 1 inhaler 5   • amLODIPine (NORVASC) 10 MG tablet      • ASPIRIN ADULT LOW  STRENGTH 81 MG EC tablet      • atorvastatin (LIPITOR) 40 MG tablet      • Breo Ellipta 200-25 MCG/INH inhaler Inhale 1 puff Daily. 180 each 2   • cholecalciferol (VITAMIN D3) 25 MCG (1000 UT) tablet Take 1,000 Units by mouth Daily.     • fluticasone (FLONASE) 50 MCG/ACT nasal spray SPRAY TWO (2) SPRAYS INTO EACH NOSTRIL EVERY DAY AS DIRECTED   16 g 5   • folic acid (FOLVITE) 1 MG tablet Take 1 mg by mouth Daily.     • HAVRIX 1440 EL U/ML vaccine      • indomethacin (INDOCIN) 50 MG capsule 50 mg 3 (Three) Times a Day With Meals.     • losartan-hydrochlorothiazide (HYZAAR) 100-25 MG per tablet      • methotrexate 2.5 MG tablet Take  by mouth 3 (Three) Doses Each Week. Take Doses 12 (Twelve) Hours Apart. Patient takes 4 pills once every week     • SHINGRIX 50 MCG/0.5ML reconstituted suspension      • tiotropium bromide monohydrate (Spiriva Respimat) 2.5 MCG/ACT aerosol solution inhaler Inhale 2 puffs Daily. 3 each 2   • Zinc 100 MG tablet Take  by mouth.     • DULoxetine (Cymbalta) 30 MG capsule Take 1 capsule by mouth Daily. 7 capsule 0   • [START ON 3/9/2021] DULoxetine (Cymbalta) 60 MG capsule Take 1 capsule by mouth Daily. 30 capsule 2   • Humira Pen 40 MG/0.4ML Pen-injector Kit      • predniSONE (DELTASONE) 10 MG tablet Take 10 mg by mouth Daily.       No current facility-administered medications for this visit.        Mental Status Exam:   Hygiene:   fair  Cooperation:  Cooperative  Eye Contact:  Good  Psychomotor Behavior:  Appropriate  Affect:  Appropriate  Mood: depressed  Speech:  Normal  Thought Process:  Goal directed  Thought Content:  Mood congruent  Suicidal:  None  Homicidal:  None  Hallucinations:  None  Delusion:  None  Memory:  Intact  Orientation:  Person, Place, Time and Situation  Reliability:  good  Insight:  Good  Judgement:  Good  Impulse Control:  Good  Physical/Medical Issues:  Yes RA, OA, ALLEGRA     Objective   Vital Signs:   /90 (BP Location: Left arm)   Pulse 84   Temp 98.4 °F (36.9  "°C) (Infrared)   Resp 18   Ht 172.7 cm (67.99\")   Wt 127 kg (280 lb 3.2 oz)   BMI 42.62 kg/m²     Physical Exam  Neurological:      General: No focal deficit present.      Mental Status: He is alert and oriented to person, place, and time. Mental status is at baseline.      Coordination: Coordination is intact.      Gait: Gait abnormal (chronic knee pain/degeneration).   Psychiatric:         Attention and Perception: Attention and perception normal.         Mood and Affect: Affect normal. Mood is depressed.         Speech: Speech normal.         Behavior: Behavior is cooperative.         Thought Content: Thought content normal. Thought content is not paranoid or delusional. Thought content does not include homicidal or suicidal ideation. Thought content does not include homicidal or suicidal plan.         Cognition and Memory: Cognition and memory normal.         Judgment: Judgment normal.        Result Review :                   Assessment and Plan    Problem List Items Addressed This Visit     None      Visit Diagnoses     Mild episode of recurrent major depressive disorder (CMS/HCC)    -  Primary    Relevant Medications    DULoxetine (Cymbalta) 30 MG capsule    DULoxetine (Cymbalta) 60 MG capsule (Start on 3/9/2021)    Generalized anxiety disorder        Relevant Medications    DULoxetine (Cymbalta) 30 MG capsule    DULoxetine (Cymbalta) 60 MG capsule (Start on 3/9/2021)          PHQ-9 Score:   PHQ-9 Total Score: 9    Depression Screening:  Patient screened positive for depression based on a PHQ-9 score of 9 on 3/1/2021. Follow-up recommendations include: Prescribed antidepressant medication treatment and Suicide Risk Assessment performed.      Tobacco Cessation:  Patient is a former smoker, having quit 3 years ago. No tobacco cessation education necessary.      Impression/Plan:  -This my initial interaction with the patient.  Patient presents today as a very pleasant but somewhat depressed 55-year-old man.  " "He endorses feelings of depression and anxiety surrounding changes in his life over the last 5 years secondary to his now chronic health issues.  He reports these health issues have forced him to become disabled, caused him to gain weight, and greatly limited his ability to live his life as he would enjoy doing so.  Patient does not like being unable to work, and endorses a lot of his self identity was wrapped up in what he was able to do.  Patient endorses some increase in financial difficulties since he became disabled as well.  -Patient has never taken any psychiatric medications in the past, and reports he is very hesitant to do so now.  Patient reports he does not like taking any medication at all, and it is a point of frustration and stress for him that he has to take as many medications that he does now for his physical issues.  Discussed the possibility of trying therapy either in place of or addition to medication to help treat the patient's anxiety and depression.  Patient reports despite not wanting to take medications, he does understand that something needs to be done, and he is willing to \"try anything once as long as it will help me\".  Patient reports he may be open to trying therapy down the road.  -Start Cymbalta 30 mg daily x7 days, then increase to 60 mg daily after.  -We will avoid prescribing anything for sleep at this time, as the patient's sleeping difficulties seem to center more on issues with his physical health than psychiatric health.  -Schedule follow-up for 1 month or as needed.    MEDS ORDERED DURING VISIT:  New Medications Ordered This Visit   Medications   • DULoxetine (Cymbalta) 30 MG capsule     Sig: Take 1 capsule by mouth Daily.     Dispense:  7 capsule     Refill:  0   • DULoxetine (Cymbalta) 60 MG capsule     Sig: Take 1 capsule by mouth Daily.     Dispense:  30 capsule     Refill:  2         Follow Up   Return in about 1 month (around 4/1/2021), or if symptoms worsen or fail " to improve, for Next scheduled follow up.  Patient was given instructions and counseling regarding his condition or for health maintenance advice. Please see specific information pulled into the AVS if appropriate.       TREATMENT PLAN/GOALS: Continue supportive psychotherapy efforts and medications as indicated. Treatment and medication options discussed during today's visit. Patient acknowledged and verbally consented to continue with current treatment plan and was educated on the importance of compliance with treatment and follow-up appointments.    MEDICATION ISSUES:  Discussed medication options and treatment plan of prescribed medication as well as the risks, benefits, and side effects including potential falls, possible impaired driving and metabolic adversities among others. Patient is agreeable to call the office with any worsening of symptoms or onset of side effects. Patient is agreeable to call 911 or go to the nearest ER should he/she begin having SI/HI.            This document has been electronically signed by ALVAREZ Jaramillo, PMHNP-BC  March 1, 2021 12:35 EST      Part of this note may be an electronic transcription/translation of spoken language to printed text using the Dragon Dictation System.

## 2021-03-29 ENCOUNTER — OFFICE VISIT (OUTPATIENT)
Dept: PSYCHIATRY | Facility: CLINIC | Age: 56
End: 2021-03-29

## 2021-03-29 DIAGNOSIS — F33.0 MILD EPISODE OF RECURRENT MAJOR DEPRESSIVE DISORDER (HCC): Primary | ICD-10-CM

## 2021-03-29 PROCEDURE — 90791 PSYCH DIAGNOSTIC EVALUATION: CPT | Performed by: SOCIAL WORKER

## 2021-03-29 NOTE — PROGRESS NOTES
"Patient ID: Darci Vargas is a 55 y.o. male presenting to [facility] for assessment with Rose Calhoun LCSW.     Time: 12:30 pm   Time out: 1:30 pm     Description of current emotional/behavioral concerns: Patient states that he has struggled to cope with multiple health conditions. He sates that he has had consistent knee pain for several years, stating his knees are \"bone on bone\". He reports that he believes his medication has helped considerably, and he is able to sleep consistently now. He states that he previously was awake 4-5 times a night. He states that has been diagnosed with COPD, osteo arthritis and rheumatoid arthritis. He states that he has struggled with feeling frustrated that he has not worked approximately three years, but states that until he was diagnosed with several health conditions he had always worked consistently. He tells me that he has struggled with fears of death after losing a close friend. Patient states he enjoys building things around their home and raising a garden. He states that he would hope to take his family fishing this summer, but fears pain and discomfort so he does not go. Patient currently lives with his wife, and has several children and grandchildren he enjoys spending time with. Patient expressed that he and his wife have financial difficulty at times because they have much less money now that they are both receiving disability. He states that his relationship with his wife has improved significantly recently since he has began medication.     Patient adamantly and convincingly denies current suicidal or homicidal ideation or perceptual disturbance.    Significant Life Events  Has patient been through or witnessed a divorce? no      Has patient experienced a death / loss of relationship? no      Has patient experienced a major accident or tragic events? no      Has patient experienced any other significant life events or trauma " (such as verbal, physical, sexual abuse)? no      Work History  Highest level of education obtained: 12th grade    Ever been active duty in the ? no    Patient's Occupation: Disabled    Legal History  The patient has no significant history of legal issues.    Interpersonal/Relational  Marital Status:   Patient's current living situation: Lives with wife  Support system: significant other  Difficulty getting along with peers: no  Difficulty making new friendships: no  Difficulty maintaining friendships: no  Close with family members: yes    Mental/Behavioral Health History  History of prior treatment or hospitalization: Yes patient receives med management at this clinic    Are there any significant health issues (current or past): Yes in epic     History of seizures: no    Family History   Problem Relation Age of Onset   • Asthma Mother    • Hyperlipidemia Mother    • Heart disease Father    • Melanoma Father    • Sleep apnea Father    • Dementia Maternal Grandmother    • ADD / ADHD Neg Hx    • Alcohol abuse Neg Hx    • Anxiety disorder Neg Hx    • Bipolar disorder Neg Hx    • Depression Neg Hx    • Drug abuse Neg Hx    • OCD Neg Hx    • Paranoid behavior Neg Hx    • Schizophrenia Neg Hx    • Seizures Neg Hx    • Self-Injurious Behavior  Neg Hx    • Suicide Attempts Neg Hx        Current Medications:   Current Outpatient Medications   Medication Sig Dispense Refill   • albuterol sulfate  (90 Base) MCG/ACT inhaler Inhale 2 puffs Every 6 (Six) Hours As Needed for Wheezing. 1 inhaler 5   • amLODIPine (NORVASC) 10 MG tablet      • ASPIRIN ADULT LOW STRENGTH 81 MG EC tablet      • atorvastatin (LIPITOR) 40 MG tablet      • Breo Ellipta 200-25 MCG/INH inhaler Inhale 1 puff Daily. 180 each 2   • cholecalciferol (VITAMIN D3) 25 MCG (1000 UT) tablet Take 1,000 Units by mouth Daily.     • DULoxetine (Cymbalta) 30 MG capsule Take 1 capsule by mouth Daily. 7 capsule 0   • DULoxetine (Cymbalta) 60 MG capsule  Take 1 capsule by mouth Daily. 30 capsule 2   • fluticasone (FLONASE) 50 MCG/ACT nasal spray SPRAY TWO (2) SPRAYS INTO EACH NOSTRIL EVERY DAY AS DIRECTED   16 g 5   • folic acid (FOLVITE) 1 MG tablet Take 1 mg by mouth Daily.     • HAVRIX 1440 EL U/ML vaccine      • Humira Pen 40 MG/0.4ML Pen-injector Kit      • indomethacin (INDOCIN) 50 MG capsule 50 mg 3 (Three) Times a Day With Meals.     • losartan-hydrochlorothiazide (HYZAAR) 100-25 MG per tablet      • methotrexate 2.5 MG tablet Take  by mouth 3 (Three) Doses Each Week. Take Doses 12 (Twelve) Hours Apart. Patient takes 4 pills once every week     • predniSONE (DELTASONE) 10 MG tablet Take 10 mg by mouth Daily.     • SHINGRIX 50 MCG/0.5ML reconstituted suspension      • tiotropium bromide monohydrate (Spiriva Respimat) 2.5 MCG/ACT aerosol solution inhaler Inhale 2 puffs Daily. 3 each 2   • Zinc 100 MG tablet Take  by mouth.       No current facility-administered medications for this visit.       History of Substance Use:   Patient answered yes  to experiencing two or more of the following problems related to substance use: using more than intended or over longer period than intended; difficulty quitting or cutting back use; spending a great deal of time obtaining, using, or recovering from using; craving or strong desire or urge to use;  work and/or school problems; financial problems; family problems; using in dangerous situations; physical or mental health problems; relapse; feelings of guilt or remorse about use; times when used and/or drank alone; needing to use more in order to achieve the desired effect; illness or withdrawal when stopping or cutting back use; using to relieve or avoid getting ill or developing withdrawal symptoms; and black outs and/or memory issues when using.        Substance Age Frequency Amount Method Last use   Nicotine  Previous use      Alcohol        Marijuana        Benzo        Pain Pills        Cocaine        Meth         Heroin        Suboxone        Synthetics/Other:                SUICIDE RISK ASSESSMENT/CSSRS  1. Does patient have thoughts of suicide? no  2. Does patient have intent for suicide? no  3. Does patient have a current plan for suicide? no  4. History of suicide attempts: no  5. Family history of suicide or attempts: no  6. History of violent behaviors towards others or property or thoughts of committing suicide: no  7. History of sexual aggression toward others: no  8. Access to firearms or weapons: no    Mental Status Exam:   Hygiene:   good  Cooperation:  Cooperative  Eye Contact:  Good  Psychomotor Behavior:  Appropriate  Affect:  Full range  Mood: normal  Hopelessness: Denies  Speech:  Normal  Thought Process:  Goal directed  Thought Content:  Normal  Suicidal:  None  Homicidal:  None  Hallucinations:  None  Delusion:  None  Memory:  Intact  Orientation:  Person, Place, Time and Situation  Reliability:  good  Insight:  Good  Judgement:  Good  Impulse Control:  Good    Impression/Formulation:    VISIT DIAGNOSIS:     ICD-10-CM ICD-9-CM   1. Mild episode of recurrent major depressive disorder (CMS/[facility])  F33.0 296.31        Patient appeared alert and oriented.  Patient is voluntarily requesting to begin outpatient therapy at [facility].  Patient is receptive to assistance with maintaining a stable lifestyle.  Patient presents with history of depression.  Patient is agreeable to attend routine therapy sessions.  Patient expressed desire to maintain stability and participate in the therapeutic process.        Crisis Plan:  Symptoms and/or behaviors to indicate a crisis: Feeling sad or low    What calming techniques or other strategies will patient use to de-esclate and stay safe: slow down, breathe, visualize calming self, think it though, listen to music, change focus, take a walk    Who is one person patient can contact to assist with de-escalation? His wife    If symptoms/behaviors persist,  patient will present to the nearest hospital for an assessment. Advised patient of [facility] 24/7 assessment services.       Plan:   Obtain release of information for current treatment team for continuity of care  Patient will adhere to medication regimen as prescribed and report any side effects. Patient will contact this office, call 911 or present to the nearest emergency room should suicidal or homicidal ideations occur.  Begin psychotherapy         This document has been electronically signed by RODERICK Salas, ONEIDA  March 30, 2021 09:49 EDT    Part of this note may be an electronic transcription/translation of spoken language to printed text using the Dragon Dictation System.

## 2021-04-13 ENCOUNTER — OFFICE VISIT (OUTPATIENT)
Dept: PSYCHIATRY | Facility: CLINIC | Age: 56
End: 2021-04-13

## 2021-04-13 VITALS — WEIGHT: 248 LBS | HEIGHT: 68 IN | BODY MASS INDEX: 37.59 KG/M2

## 2021-04-13 DIAGNOSIS — F41.1 GENERALIZED ANXIETY DISORDER: Chronic | ICD-10-CM

## 2021-04-13 DIAGNOSIS — F33.0 MILD EPISODE OF RECURRENT MAJOR DEPRESSIVE DISORDER (HCC): Primary | Chronic | ICD-10-CM

## 2021-04-13 PROCEDURE — 99214 OFFICE O/P EST MOD 30 MIN: CPT | Performed by: NURSE PRACTITIONER

## 2021-04-13 NOTE — PROGRESS NOTES
"Chief Complaint  Anxiety, Depression, and Sleeping Problem    Subjective          Darci Vargas presents to [facility] for medication management of his anxiety and depression.    History of Present Illness: Patient presents today for follow-up appointment after last being seen for initial evaluation on 03/01/2021.  At previous appointment, the patient presented with complaints of anxiety and depression and was started on Cymbalta 60 mg daily.  Patient presents today and reports he is doing significantly better.  Patient reports he is sleeping much better, and his anxiety and depression have been greatly reduced.  Patient also reports he has lost weight saying \"I have been seeing a nutritionist for a little while now and it is really helping to\".  Patient reports he likes the Cymbalta quite a bit, and says he has noticed he has more energy and \"I just feel better\".  Patient reports he has been getting out more, and trying to be more active.  Last week he and his wife took a weekend trip to [place], and says they both \"really enjoyed it\".  Patient denies any adverse effects associated with medications.  Patient denies any SI/HI, and hallucinations.    Current Medications:   Current Outpatient Medications   Medication Sig Dispense Refill   • DULoxetine (Cymbalta) 60 MG capsule Take 1 capsule by mouth Daily. 30 capsule 2   • albuterol sulfate  (90 Base) MCG/ACT inhaler Inhale 2 puffs Every 6 (Six) Hours As Needed for Wheezing. 1 inhaler 5   • amLODIPine (NORVASC) 10 MG tablet      • ASPIRIN ADULT LOW STRENGTH 81 MG EC tablet      • atorvastatin (LIPITOR) 40 MG tablet      • Breo Ellipta 200-25 MCG/INH inhaler Inhale 1 puff Daily. 180 each 2   • cholecalciferol (VITAMIN D3) 25 MCG (1000 UT) tablet Take 1,000 Units by mouth Daily.     • fluticasone (FLONASE) 50 MCG/ACT nasal spray SPRAY TWO (2) SPRAYS INTO EACH NOSTRIL EVERY DAY AS DIRECTED   16 g 5   • folic acid (FOLVITE) 1 MG " "tablet Take 1 mg by mouth Daily.     • HAVRIX 1440 EL U/ML vaccine      • Humira Pen 40 MG/0.4ML Pen-injector Kit      • indomethacin (INDOCIN) 50 MG capsule 50 mg 3 (Three) Times a Day With Meals.     • losartan-hydrochlorothiazide (HYZAAR) 100-25 MG per tablet      • methotrexate 2.5 MG tablet Take  by mouth 3 (Three) Doses Each Week. Take Doses 12 (Twelve) Hours Apart. Patient takes 4 pills once every week     • predniSONE (DELTASONE) 10 MG tablet Take 10 mg by mouth Daily.     • SHINGRIX 50 MCG/0.5ML reconstituted suspension      • tiotropium bromide monohydrate (Spiriva Respimat) 2.5 MCG/ACT aerosol solution inhaler Inhale 2 puffs Daily. 3 each 2   • Zinc 100 MG tablet Take  by mouth.       No current facility-administered medications for this visit.       Mental Status Exam:   Hygiene:   good  Cooperation:  Cooperative  Eye Contact:  Good  Psychomotor Behavior:  Appropriate  Affect:  Appropriate  Mood: normal  Speech:  Normal  Thought Process:  Goal directed  Thought Content:  Mood congruent  Suicidal:  None  Homicidal:  None  Hallucinations:  None  Delusion:  None  Memory:  Intact  Orientation:  Person, Place, Time and Situation  Reliability:  good  Insight:  Good  Judgement:  Good  Impulse Control:  Good  Physical/Medical Issues:  Yes Multiple medical comorbidities       Objective   Vital Signs:   Ht 172.7 cm (68\")   Wt 112 kg (248 lb)   BMI 37.71 kg/m²     Physical Exam  Neurological:      Mental Status: He is oriented to person, place, and time. Mental status is at baseline.      Gait: Gait abnormal (Secondary to chronic back issues).   Psychiatric:         Behavior: Behavior is cooperative.         Thought Content: Thought content normal.         Cognition and Memory: Cognition and memory normal.         Judgment: Judgment normal.        Result Review :     The following data was reviewed by: ALVAREZ Dobbins on 04/13/2021:    Data reviewed: Previous note, and medication history.          Assessment " "and Plan    Problem List Items Addressed This Visit     None      Visit Diagnoses     Mild episode of recurrent major depressive disorder (CMS/HCC)  (Chronic)   -  Primary    Generalized anxiety disorder  (Chronic)             PHQ-9 Score:   PHQ-9 Total Score: 1    Depression Screening:  Patient screened positive for depression based on a PHQ-9 score of 1 on 4/13/2021. Follow-up recommendations include: Prescribed antidepressant medication treatment and Suicide Risk Assessment performed.      Tobacco Cessation:  Patient is a former smoker, having previously quit. No tobacco cessation education necessary.    Impression/Plan:  -This my first follow-up appoint with patient.  Patient presents today and reports he is doing significantly better since starting Cymbalta at the last appointment.  Patient reports he has more energy, his mood is improved, and he has been sleeping significantly better.  Patient reports he takes the medication at night, and says it has reduced his anxiety to the point where he is able to sleep \"the best I have slept in years\".  -Maintain Cymbalta 60 mg daily.  Patient has refills.  -Schedule follow-up for 1 month or as needed.    MEDS ORDERED DURING VISIT:  No orders of the defined types were placed in this encounter.        Follow Up   Return in about 1 month (around 5/13/2021), or if symptoms worsen or fail to improve, for Next scheduled follow up.  Patient was given instructions and counseling regarding his condition or for health maintenance advice. Please see specific information pulled into the AVS if appropriate.       TREATMENT PLAN/GOALS: Continue supportive psychotherapy efforts and medications as indicated. Treatment and medication options discussed during today's visit. Patient acknowledged and verbally consented to continue with current treatment plan and was educated on the importance of compliance with treatment and follow-up appointments.    MEDICATION ISSUES:  Discussed " medication options and treatment plan of prescribed medication as well as the risks, benefits, and side effects including potential falls, possible impaired driving and metabolic adversities among others. Patient is agreeable to call the office with any worsening of symptoms or onset of side effects. Patient is agreeable to call 911 or go to the nearest ER should he/she begin having SI/HI.          This document has been electronically signed by ALVAREZ Jaramillo, PMHNP-BC  April 13, 2021 13:22 EDT      Part of this note may be an electronic transcription/translation of spoken language to printed text using the Dragon Dictation System.

## 2021-05-11 ENCOUNTER — OFFICE VISIT (OUTPATIENT)
Dept: PSYCHIATRY | Facility: CLINIC | Age: 56
End: 2021-05-11

## 2021-05-11 VITALS
HEART RATE: 100 BPM | BODY MASS INDEX: 36.98 KG/M2 | SYSTOLIC BLOOD PRESSURE: 118 MMHG | TEMPERATURE: 97.7 F | WEIGHT: 244 LBS | HEIGHT: 68 IN | DIASTOLIC BLOOD PRESSURE: 64 MMHG | RESPIRATION RATE: 18 BRPM

## 2021-05-11 DIAGNOSIS — F41.1 GENERALIZED ANXIETY DISORDER: Chronic | ICD-10-CM

## 2021-05-11 DIAGNOSIS — F33.0 MILD EPISODE OF RECURRENT MAJOR DEPRESSIVE DISORDER (HCC): Chronic | ICD-10-CM

## 2021-05-11 PROCEDURE — 99214 OFFICE O/P EST MOD 30 MIN: CPT | Performed by: NURSE PRACTITIONER

## 2021-05-11 RX ORDER — TAMSULOSIN HYDROCHLORIDE 0.4 MG/1
1 CAPSULE ORAL DAILY
COMMUNITY

## 2021-05-11 RX ORDER — DULOXETIN HYDROCHLORIDE 60 MG/1
60 CAPSULE, DELAYED RELEASE ORAL DAILY
Qty: 30 CAPSULE | Refills: 2 | Status: SHIPPED | OUTPATIENT
Start: 2021-05-11

## 2021-05-11 NOTE — PROGRESS NOTES
"Chief Complaint  Anxiety and Depression    Subjective          Darci Vargas presents to [facility] for medication management of his anxiety and depression.    History of Present Illness: Patient presents today for follow-up appointment after last being seen on 04/13/2021.  At previous appointment, the patient was reportedly doing well and maintained on his medication and his current dose.  Patient presents today and reports he continues to do well.  Patient continues to lose weight and has lost approximately 40 pounds in the last 3 months.  Patient reports he is feeling well, and trying to stay as active as he can.  Patient did report he had some recent blood work which showed possible kidney and/or liver issues.  His PCP stopped most of his medications \"which is fine with me because I do not much like taking medicine anyways\".  Patient endorses being somewhat concerned over the recent blood work, but says he is keeping a positive outlook.  Patient reports he is continuing to sleep well without any issues.  Patient denies any issues with appetite.  Patient denies any SI/HI, A/V hallucinations.    Current Medications:   Current Outpatient Medications   Medication Sig Dispense Refill   • albuterol sulfate  (90 Base) MCG/ACT inhaler Inhale 2 puffs Every 6 (Six) Hours As Needed for Wheezing. 1 inhaler 5   • amLODIPine (NORVASC) 10 MG tablet      • Breo Ellipta 200-25 MCG/INH inhaler Inhale 1 puff Daily. 180 each 2   • DULoxetine (Cymbalta) 60 MG capsule Take 1 capsule by mouth Daily. 30 capsule 2   • fluticasone (FLONASE) 50 MCG/ACT nasal spray SPRAY TWO (2) SPRAYS INTO EACH NOSTRIL EVERY DAY AS DIRECTED   16 g 5   • HAVRIX 1440 EL U/ML vaccine      • losartan-hydrochlorothiazide (HYZAAR) 100-25 MG per tablet      • tamsulosin (FLOMAX) 0.4 MG capsule 24 hr capsule Take 1 capsule by mouth Daily.     • SHINGRIX 50 MCG/0.5ML reconstituted suspension      • tiotropium bromide " "monohydrate (Spiriva Respimat) 2.5 MCG/ACT aerosol solution inhaler Inhale 2 puffs Daily. 3 each 2     No current facility-administered medications for this visit.       Mental Status Exam:   Hygiene:   good  Cooperation:  Cooperative  Eye Contact:  Good  Psychomotor Behavior:  Appropriate  Affect:  Appropriate  Mood: euthymic  Speech:  Normal  Thought Process:  Goal directed  Thought Content:  Mood congruent  Suicidal:  None  Homicidal:  None  Hallucinations:  None  Delusion:  None  Memory:  Intact  Orientation:  Person, Place, Time and Situation  Reliability:  good  Insight:  Good  Judgement:  Good  Impulse Control:  Good  Physical/Medical Issues:  Yes COPD, HLD, HTN, RA       Objective   Vital Signs:   /64 (BP Location: Left arm)   Pulse 100   Temp 97.7 °F (36.5 °C) (Infrared)   Resp 18   Ht 172.7 cm (68\")   Wt 111 kg (244 lb)   BMI 37.10 kg/m²     Physical Exam  Neurological:      Mental Status: He is oriented to person, place, and time. Mental status is at baseline.      Coordination: Coordination is intact.      Gait: Gait is intact.   Psychiatric:         Behavior: Behavior is cooperative.         Thought Content: Thought content normal.         Cognition and Memory: Cognition and memory normal.         Judgment: Judgment normal.        Result Review :     The following data was reviewed by: AVLAREZ Dobbins on 05/11/2021:    Data reviewed: Previous note, and medication history.          Assessment and Plan    Problem List Items Addressed This Visit     None      Visit Diagnoses     Mild episode of recurrent major depressive disorder (CMS/HCC)  (Chronic)       Relevant Medications    DULoxetine (Cymbalta) 60 MG capsule    Generalized anxiety disorder  (Chronic)       Relevant Medications    DULoxetine (Cymbalta) 60 MG capsule          PHQ-9 Score:   PHQ-9 Total Score: 0    Depression Screening:  Patient screened positive for depression based on a PHQ-9 score of 0 on 5/11/2021. Follow-up " recommendations include: Prescribed antidepressant medication treatment and Suicide Risk Assessment performed.      Tobacco Cessation:  Patient is a former smoker, having previously quit. No tobacco cessation education necessary.      Impression/Plan:  -This is a follow-up appointment.  Patient presents today and continues to do well.  Patient reports he is happy with the progress he has made, and says the last month has been good with regards to his mood and anxiety.  Patient expresses some concern over his upcoming tests he has to have completed, but is maintaining a positive outlook.  -Maintain Cymbalta 60 mg daily.  Refill sent.  -Schedule follow-up for 2 months or as needed.    MEDS ORDERED DURING VISIT:  New Medications Ordered This Visit   Medications   • DULoxetine (Cymbalta) 60 MG capsule     Sig: Take 1 capsule by mouth Daily.     Dispense:  30 capsule     Refill:  2         Follow Up   Return in about 2 months (around 7/11/2021), or if symptoms worsen or fail to improve, for Next scheduled follow up.  Patient was given instructions and counseling regarding his condition or for health maintenance advice. Please see specific information pulled into the AVS if appropriate.       TREATMENT PLAN/GOALS: Continue supportive psychotherapy efforts and medications as indicated. Treatment and medication options discussed during today's visit. Patient acknowledged and verbally consented to continue with current treatment plan and was educated on the importance of compliance with treatment and follow-up appointments.    MEDICATION ISSUES:  Discussed medication options and treatment plan of prescribed medication as well as the risks, benefits, and side effects including potential falls, possible impaired driving and metabolic adversities among others. Patient is agreeable to call the office with any worsening of symptoms or onset of side effects. Patient is agreeable to call 911 or go to the nearest ER should he/she  begin having SI/HI.          This document has been electronically signed by ALVAREZ Jaramillo, PMHNP-BC  May 11, 2021 13:48 EDT      Part of this note may be an electronic transcription/translation of spoken language to printed text using the Dragon Dictation System.

## 2021-06-08 ENCOUNTER — OFFICE VISIT (OUTPATIENT)
Dept: PSYCHIATRY | Facility: CLINIC | Age: 56
End: 2021-06-08

## 2021-06-08 DIAGNOSIS — F33.0 MILD EPISODE OF RECURRENT MAJOR DEPRESSIVE DISORDER (HCC): Primary | ICD-10-CM

## 2021-06-08 PROCEDURE — 90837 PSYTX W PT 60 MINUTES: CPT | Performed by: SOCIAL WORKER

## 2021-06-08 NOTE — PROGRESS NOTES
Date: June 8, 2021  Time In: 1:30 pm   Time Out: 2:30 pm       PROGRESS NOTE  Data:  Darci Vargas is a 55 y.o. male who presents today for individual therapy session at [facility].     Patient states that he is doing well, and that he has lost over 60 lbs. Patient states that he has been getting out of the house doing gardening, and going to yard sales with his father. Patient states that his daughter recently visted him from [place] and his grandchildren. He states that he enjoyed spending time with family. Patient states that he does not feel depressed at this time, and believes it is because he has found purpose. He states that he recently had shots in his knee so he is able to get around better. Patient shares that he had a biopsy on his pancreas, and he is awaiting those results which is making him slightly anxious.       Clinical Maneuvering/Intervention:    Assisted patient in processing above session content; acknowledged and normalized patient’s thoughts, feelings, and concerns.  Rationalized patient thought process regarding depression.  Discussed triggers associated with patient's depression.  Also discussed coping skills for patient to implement such as deep breathing, mindfulness, visualization.    Allowed patient to freely discuss issues without interruption or judgment. Provided safe, confidential environment to facilitate the development of positive therapeutic relationship and encourage open, honest communication. Assisted patient in identifying risk factors which would indicate the need for higher level of care including thoughts to harm self or others and/or self-harming behavior and encouraged patient to contact this office, call 911, or present to the nearest emergency room should any of these events occur. Discussed crisis intervention services and means to access. Patient adamantly and convincingly denies current suicidal or homicidal ideation or perceptual  disturbance.    Assessment   Patient appears to maintain relative stability as compared to their baseline.  However, patient continues to struggle with depression which continues to cause impairment in important areas of functioning.  A result, they can be reasonably expected to continue to benefit from treatment and would likely be at increased risk for decompensation otherwise.    Mental Status Exam:   Hygiene:   good  Cooperation:  Cooperative  Eye Contact:  Good  Psychomotor Behavior:  Appropriate  Affect:  Full range  Mood: normal  Speech:  Normal  Thought Process:  Goal directed  Thought Content:  Normal  Suicidal:  None  Homicidal:  None  Hallucinations:  None  Delusion:  None  Memory:  Intact  Orientation:  Person, Place, Time and Situation  Reliability:  good  Insight:  Good  Judgement:  Good  Impulse Control:  Good  Physical/Medical Issues:  No          Patient's Support Network Includes:  wife    Functional Status: No impairment    Progress toward goal: At goal    Prognosis: Good with Ongoing Treatment          Plan     Patient will continue in individual outpatient therapy with focus on improved functioning and coping skills, maintaining stability, and avoiding decompensation and the need for higher level of care.    Patient will adhere to medication regimen as prescribed and report any side effects. Patient will contact this office, call 911 or present to the nearest emergency room should suicidal or homicidal ideations occur. Provide Cognitive Behavioral Therapy and Solution Focused Therapy to improve functioning, maintain stability, and avoid decompensation and the need for higher level of care.     Return in about 6 weeks, or earlier if symptoms worsen or fail to improve.           VISIT DIAGNOSIS:     ICD-10-CM ICD-9-CM   1. Mild episode of recurrent major depressive disorder (CMS/[facility])  F33.0 296.31             This document has been electronically signed by Rose Calhoun LCSW  June 8, 2021  14:34 EDT      Part of this note may be an electronic transcription/translation of spoken language to printed text using the Dragon Dictation System.

## 2021-08-27 ENCOUNTER — PATIENT ROUNDING (BHMG ONLY) (OUTPATIENT)
Dept: UROLOGY | Facility: CLINIC | Age: 56
End: 2021-08-27

## 2021-08-27 ENCOUNTER — OFFICE VISIT (OUTPATIENT)
Dept: UROLOGY | Facility: CLINIC | Age: 56
End: 2021-08-27

## 2021-08-27 VITALS
RESPIRATION RATE: 18 BRPM | BODY MASS INDEX: 37.11 KG/M2 | OXYGEN SATURATION: 92 % | HEIGHT: 68 IN | DIASTOLIC BLOOD PRESSURE: 98 MMHG | HEART RATE: 82 BPM | SYSTOLIC BLOOD PRESSURE: 160 MMHG

## 2021-08-27 DIAGNOSIS — R97.20 ELEVATED PROSTATE SPECIFIC ANTIGEN (PSA): Primary | ICD-10-CM

## 2021-08-27 LAB
BILIRUB BLD-MCNC: NEGATIVE MG/DL
CLARITY, POC: CLEAR
COLOR UR: YELLOW
GLUCOSE UR STRIP-MCNC: NEGATIVE MG/DL
KETONES UR QL: NEGATIVE
LEUKOCYTE EST, POC: NEGATIVE
NITRITE UR-MCNC: NEGATIVE MG/ML
PH UR: 6 [PH] (ref 5–8)
PROT UR STRIP-MCNC: NEGATIVE MG/DL
RBC # UR STRIP: NEGATIVE /UL
SP GR UR: 1.02 (ref 1–1.03)
UROBILINOGEN UR QL: NORMAL

## 2021-08-27 PROCEDURE — 81003 URINALYSIS AUTO W/O SCOPE: CPT | Performed by: UROLOGY

## 2021-08-27 PROCEDURE — 99204 OFFICE O/P NEW MOD 45 MIN: CPT | Performed by: UROLOGY

## 2021-08-27 RX ORDER — LOSARTAN POTASSIUM 100 MG/1
100 TABLET ORAL DAILY
COMMUNITY
Start: 2021-07-27

## 2021-08-27 RX ORDER — POTASSIUM CHLORIDE 20 MEQ/1
TABLET, EXTENDED RELEASE ORAL
COMMUNITY
Start: 2021-07-27 | End: 2021-11-01

## 2021-08-27 NOTE — PROGRESS NOTES
"Chief Complaint  Elevated PSA    Referring Provider  Margarita Parra*    HPI  Mr. Vargas is a 55 y.o.  male who presents with an elevated PSA to 8.7 on 4/27/21. He says it was rechecked and \"not that bad\".  He says he had prostate Bx 3 mo ago w/ Dr. Barnes and it was negative.      Patient complains of nocturia, urgency and weak urinary stream.  His IPSS score is 14.  On tamsulosin. Took finasteride in past    Patient denies fevers, chills, nausea, vomiting, constipation, or flank pain.  Past  history is negative for BPH, frequent UTIs, gross hematuria, stones or other renal diseases.     He denies shortness of breath, leg swelling, calf pain or bone pain.    Past Medical History  Past Medical History:   Diagnosis Date   • COPD (chronic obstructive pulmonary disease) (CMS/[facility])    • High cholesterol    • Hypertension    • Osteoarthrosis    • Pneumonia    • RA (rheumatoid arthritis) (CMS/[facility])    • Tobacco abuse        Past Surgical History  Past Surgical History:   Procedure Laterality Date   • CHOLECYSTECTOMY         Medications    Current Outpatient Medications:   •  albuterol sulfate  (90 Base) MCG/ACT inhaler, Inhale 2 puffs Every 6 (Six) Hours As Needed for Wheezing., Disp: 1 inhaler, Rfl: 5  •  amLODIPine (NORVASC) 10 MG tablet, , Disp: , Rfl:   •  Breo Ellipta 200-25 MCG/INH inhaler, Inhale 1 puff Daily., Disp: 180 each, Rfl: 2  •  DULoxetine (Cymbalta) 60 MG capsule, Take 1 capsule by mouth Daily., Disp: 30 capsule, Rfl: 2  •  fluticasone (FLONASE) 50 MCG/ACT nasal spray, SPRAY TWO (2) SPRAYS INTO EACH NOSTRIL EVERY DAY AS DIRECTED  , Disp: 16 g, Rfl: 5  •  losartan (COZAAR) 100 MG tablet, , Disp: , Rfl:   •  tamsulosin (FLOMAX) 0.4 MG capsule 24 hr capsule, Take 1 capsule by mouth Daily., Disp: , Rfl:   •  tiotropium bromide monohydrate (Spiriva Respimat) 2.5 MCG/ACT aerosol solution inhaler, Inhale 2 puffs Daily., Disp: 3 each, Rfl: 2  •  HAVRIX 1440 EL U/ML vaccine, , Disp: , Rfl: " "  •  losartan-hydrochlorothiazide (HYZAAR) 100-25 MG per tablet, , Disp: , Rfl:   •  potassium chloride (K-DUR,KLOR-CON) 20 MEQ CR tablet, , Disp: , Rfl:   •  SHINGRIX 50 MCG/0.5ML reconstituted suspension, , Disp: , Rfl:     Allergies  No Known Allergies    Social History  Social History     Tobacco Use   • Smoking status: Former Smoker     Packs/day: 1.00     Types: Cigarettes     Quit date: 2019     Years since quittin.6   • Smokeless tobacco: Never Used   • Tobacco comment: started smoking at 15 years of age    Vaping Use   • Vaping Use: Never used   Substance Use Topics   • Alcohol use: No   • Drug use: No       Family History  Family History   Problem Relation Age of Onset   • Asthma Mother    • Hyperlipidemia Mother    • Heart disease Father    • Melanoma Father    • Sleep apnea Father    • Dementia Maternal Grandmother    • ADD / ADHD Neg Hx    • Alcohol abuse Neg Hx    • Anxiety disorder Neg Hx    • Bipolar disorder Neg Hx    • Depression Neg Hx    • Drug abuse Neg Hx    • OCD Neg Hx    • Paranoid behavior Neg Hx    • Schizophrenia Neg Hx    • Seizures Neg Hx    • Self-Injurious Behavior  Neg Hx    • Suicide Attempts Neg Hx       He has no family history of prostate cancer.    Review of Systems  A full review of systems was completed and notable for HTN.    Physical Exam  Visit Vitals  /98 (BP Location: Right arm, Patient Position: Sitting)   Pulse 82   Resp 18   Ht 172.7 cm (67.99\")   SpO2 92%   BMI 37.11 kg/m²     A physical exam was notable for obese.    Genitourinary  Deferred    Labs  Brief Urine Lab Results  (Last result in the past 365 days)      Color   Clarity   Blood   Leuk Est   Nitrite   Protein   CREAT   Urine HCG        21 1035 Yellow Clear Negative Negative Negative Negative               No results found for: PSA      Assessment  Mr. Vargas is a 55 y.o.  male who presents with elevated PSA w/ neg Bx and chronic worsening LUTS despite medical therapy.    Plan  1. Get " Bx results from [facility]  2. Fu for cysto, uroflow, GE, TRUS  3. Repeat PSA today

## 2021-08-27 NOTE — PROGRESS NOTES
August 27, 2021    Patient rounding performed during patient's office visit.  He was satisfied with overall visit.  No additional comments to offer.  Heri, Practice Manager

## 2021-08-28 LAB
PSA FREE MFR SERPL: 7.9 %
PSA FREE SERPL-MCNC: 0.19 NG/ML
PSA SERPL-MCNC: 2.4 NG/ML (ref 0–4)

## 2021-09-20 ENCOUNTER — OFFICE VISIT (OUTPATIENT)
Dept: PSYCHIATRY | Facility: CLINIC | Age: 56
End: 2021-09-20

## 2021-09-20 DIAGNOSIS — F41.1 GENERALIZED ANXIETY DISORDER: ICD-10-CM

## 2021-09-20 DIAGNOSIS — F33.0 MILD EPISODE OF RECURRENT MAJOR DEPRESSIVE DISORDER (HCC): Primary | ICD-10-CM

## 2021-09-20 PROCEDURE — 90837 PSYTX W PT 60 MINUTES: CPT | Performed by: SOCIAL WORKER

## 2021-09-21 ENCOUNTER — TELEPHONE (OUTPATIENT)
Dept: PSYCHIATRY | Facility: CLINIC | Age: 56
End: 2021-09-21

## 2021-09-23 NOTE — PROGRESS NOTES
Date: September 23, 2021  Time In: 1:30 pm   Time Out: 2:30 pm       PROGRESS NOTE  Data:  Darci Vragas is a 55 y.o. male who presents today for individual therapy session at [facility].     Patient tells me that he will need a knee replacement surgery soon. Patient entered the office ambulating with a walker. Patient tells me that he has excessive knee pain. Patient tells me that he has been stressed recently. He tells me that he and his wife have a combined income from disability payments of 1,900 dollars per month. He tells me that they are trying to cut back on expenses in every way and are still struggling to pay their bills. Patient tells me that he and his wife go to the food pantry monthly due to not meeting income requirements for SNAP benefits. Patient tells me that throughout his life he has noticed feelings of worthlessness. Patient and therapsit discussed core beliefs and their ability to negatively impact depression symptoms. Patient tells me that he sees himself as worthless due to his childhood and cultural values that “men should work.” Patient and therapist discussed patient giving himself permission to understand he receives disability and it makes sense that he would not work due to health conditions. Therapist encouraged patient to examine negative core beliefs and list 5 ways he is “worthwhile” regardless of his work condition.         Clinical Maneuvering/Intervention:    Assisted patient in processing above session content; acknowledged and normalized patient’s thoughts, feelings, and concerns.  Rationalized patient thought process regarding depression.  Discussed triggers associated with patient's depression.  Also discussed coping skills for patient to implement such as deep breathing, mindfulness, visualization.    Allowed patient to freely discuss issues without interruption or judgment. Provided safe, confidential environment to facilitate the development of positive  therapeutic relationship and encourage open, honest communication. Assisted patient in identifying risk factors which would indicate the need for higher level of care including thoughts to harm self or others and/or self-harming behavior and encouraged patient to contact this office, call 911, or present to the nearest emergency room should any of these events occur. Discussed crisis intervention services and means to access. Patient adamantly and convincingly denies current suicidal or homicidal ideation or perceptual disturbance.    Assessment   Patient appears to maintain relative stability as compared to their baseline.  However, patient continues to struggle with depression which continues to cause impairment in important areas of functioning.  A result, they can be reasonably expected to continue to benefit from treatment and would likely be at increased risk for decompensation otherwise.    Mental Status Exam:   Hygiene:   good  Cooperation:  Cooperative  Eye Contact:  Good  Psychomotor Behavior:  Appropriate  Affect:  Full range  Mood: normal  Speech:  Normal  Thought Process:  Goal directed  Thought Content:  Normal  Suicidal:  None  Homicidal:  None  Hallucinations:  None  Delusion:  None  Memory:  Intact  Orientation:  Person, Place, Time and Situation  Reliability:  good  Insight:  Good  Judgement:  Good  Impulse Control:  Good  Physical/Medical Issues:  No          Patient's Support Network Includes:  wife    Functional Status: No impairment    Progress toward goal: At goal    Prognosis: Good with Ongoing Treatment          Plan     Patient will continue in individual outpatient therapy with focus on improved functioning and coping skills, maintaining stability, and avoiding decompensation and the need for higher level of care.    Patient will adhere to medication regimen as prescribed and report any side effects. Patient will contact this office, call 911 or present to the nearest emergency room should  suicidal or homicidal ideations occur. Provide Cognitive Behavioral Therapy and Solution Focused Therapy to improve functioning, maintain stability, and avoid decompensation and the need for higher level of care.     Return in about 6 weeks, or earlier if symptoms worsen or fail to improve.           VISIT DIAGNOSIS:     ICD-10-CM ICD-9-CM   1. Mild episode of recurrent major depressive disorder (HCC)  F33.0 296.31   2. Generalized anxiety disorder  F41.1 300.02             This document has been electronically signed by Rose Calhoun LCSW  September 23, 2021 10:46 EDT      Part of this note may be an electronic transcription/translation of spoken language to printed text using the Dragon Dictation System.

## 2021-11-01 ENCOUNTER — PRE-ADMISSION TESTING (OUTPATIENT)
Dept: PREADMISSION TESTING | Facility: HOSPITAL | Age: 56
End: 2021-11-01

## 2021-11-01 ENCOUNTER — HOSPITAL ENCOUNTER (OUTPATIENT)
Dept: GENERAL RADIOLOGY | Facility: HOSPITAL | Age: 56
Discharge: HOME | End: 2021-11-01

## 2021-11-01 VITALS — BODY MASS INDEX: 33 KG/M2 | HEIGHT: 69 IN | WEIGHT: 222.8 LBS

## 2021-11-01 LAB
ANION GAP SERPL CALCULATED.3IONS-SCNC: 7.9 MMOL/L (ref 5–15)
BUN SERPL-MCNC: 7 MG/DL (ref 6–20)
BUN/CREAT SERPL: 8.5 (ref 7–25)
CALCIUM SPEC-SCNC: 9.3 MG/DL (ref 8.6–10.5)
CHLORIDE SERPL-SCNC: 101 MMOL/L (ref 98–107)
CO2 SERPL-SCNC: 30.1 MMOL/L (ref 22–29)
CREAT SERPL-MCNC: 0.82 MG/DL (ref 0.76–1.27)
DEPRECATED RDW RBC AUTO: 43.6 FL (ref 37–54)
ERYTHROCYTE [DISTWIDTH] IN BLOOD BY AUTOMATED COUNT: 13.4 % (ref 12.3–15.4)
GFR SERPL CREATININE-BSD FRML MDRD: 97 ML/MIN/1.73
GLUCOSE SERPL-MCNC: 95 MG/DL (ref 65–99)
HCT VFR BLD AUTO: 52.7 % (ref 37.5–51)
HGB BLD-MCNC: 17.2 G/DL (ref 13–17.7)
MCH RBC QN AUTO: 28.8 PG (ref 26.6–33)
MCHC RBC AUTO-ENTMCNC: 32.6 G/DL (ref 31.5–35.7)
MCV RBC AUTO: 88.3 FL (ref 79–97)
PLATELET # BLD AUTO: 370 10*3/MM3 (ref 140–450)
PMV BLD AUTO: 9.2 FL (ref 6–12)
POTASSIUM SERPL-SCNC: 4.2 MMOL/L (ref 3.5–5.2)
QT INTERVAL: 368 MS
QTC INTERVAL: 419 MS
RBC # BLD AUTO: 5.97 10*6/MM3 (ref 4.14–5.8)
SODIUM SERPL-SCNC: 139 MMOL/L (ref 136–145)
WBC # BLD AUTO: 10.08 10*3/MM3 (ref 3.4–10.8)

## 2021-11-01 PROCEDURE — 36415 COLL VENOUS BLD VENIPUNCTURE: CPT

## 2021-11-01 PROCEDURE — 80048 BASIC METABOLIC PNL TOTAL CA: CPT

## 2021-11-01 PROCEDURE — U0004 COV-19 TEST NON-CDC HGH THRU: HCPCS

## 2021-11-01 PROCEDURE — 85027 COMPLETE CBC AUTOMATED: CPT

## 2021-11-01 PROCEDURE — C9803 HOPD COVID-19 SPEC COLLECT: HCPCS

## 2021-11-01 PROCEDURE — 93005 ELECTROCARDIOGRAM TRACING: CPT

## 2021-11-01 PROCEDURE — 93010 ELECTROCARDIOGRAM REPORT: CPT | Performed by: INTERNAL MEDICINE

## 2021-11-01 PROCEDURE — 71045 X-RAY EXAM CHEST 1 VIEW: CPT

## 2021-11-01 ASSESSMENT — KOOS JR
KOOS JR SCORE: 15
KOOS JR SCORE: 50.012

## 2021-11-01 NOTE — DISCHARGE INSTRUCTIONS
PAT PASS GIVEN/REVIEWED WITH PT.  VERBALIZED UNDERSTANDING OF THE FOLLOWING:  DO NOT EAT, DRINK, SMOKE, USE SMOKELESS TOBACCO OR CHEW GUM AFTER MIDNIGHT THE NIGHT BEFORE SURGERY.  THIS ALSO INCLUDES HARD CANDIES AND MINTS.    DO NOT SHAVE THE AREA TO BE OPERATED ON AT LEAST 48 HOURS PRIOR TO THE PROCEDURE.  DO NOT WEAR MAKE UP OR NAIL POLISH.  DO NOT LEAVE IN ANY PIERCING OR WEAR JEWELRY THE DAY OF SURGERY.      DO NOT USE ADHESIVES IF YOU WEAR DENTURES.    DO NOT WEAR EYE CONTACTS; BRING IN YOUR GLASSES.    ONLY TAKE MEDICATION THE MORNING OF YOUR PROCEDURE IF INSTRUCTED BY YOUR SURGEON WITH ENOUGH WATER TO SWALLOW THE MEDICATION.  IF YOUR SURGEON DID NOT SPECIFY WHICH MEDICATIONS TO TAKE, YOU WILL NEED TO CALL THEIR OFFICE FOR FURTHER INSTRUCTIONS AND DO AS THEY INSTRUCT.    LEAVE ANYTHING YOU CONSIDER VALUABLE AT HOME.    YOU WILL NEED TO ARRANGE FOR SOMEONE TO DRIVE YOU HOME AFTER SURGERY.  IT IS RECOMMENDED THAT YOU DO NOT DRIVE, WORK, DRINK ALCOHOL OR MAKE MAJOR DECISIONS FOR AT LEAST 24 HOURS AFTER YOUR PROCEDURE IS COMPLETE.      THE DAY OF YOUR PROCEDURE, BRING IN THE FOLLOWING IF APPLICABLE:   PICTURE ID AND INSURANCE/MEDICARE OR MEDICAID CARDS/ANY CO-PAY THAT MAY BE DUE   COPY OF ADVANCED DIRECTIVE/LIVING WILL/POWER OR    CPAP/BIPAP/INHALERS   SKIN PREP SHEET   YOUR PREADMISSION TESTING PASS (IF NOT A PHONE HISTORY)        Chlorhexidine wipes along with instruction/verification sheet given to pt.  Instructed pt to date, time, and initial the verification sheet once skin prep has been  completed, and to return to [facility] the day of the procedure.  Pt. Verbalizes understanding.      COVID self-quarantine instructions reviewed with the pt.  Verbalized understanding.

## 2021-11-02 LAB — SARS-COV-2 RNA PNL SPEC NAA+PROBE: NOT DETECTED

## 2021-11-02 NOTE — PAT
Patient seen in PAT 11-1-21 at 1600 for left total knee arthroplasty with Dr. Suárez, scheduled for 11-3-21.  Patient has a medical history of arthritis, HTN, and COPD.  Patient does not see a cardiologist, states that he has never experienced chest pain, and he remains active.  Patient's preliminary EKG read NSR, left anterior fascicular block, cannot rule out inferior infarct (masked by fascicular block?), age undetermined.  Patient had a EKG done at [facility] in [place] on 9-8-21 and it read SR, consistent with LAFB.  Called and spoke with Jerson Rodriguez CRNA and notified him of patient's medical history and preliminary EKG and EKG that was done on 9-8-21.  Jerson stated that nothing else was needed at this time and patient was ok to proceed with surgery.

## 2021-11-03 ENCOUNTER — HOSPITAL ENCOUNTER (OUTPATIENT)
Facility: HOSPITAL | Age: 56
Discharge: HOME | End: 2021-11-05
Attending: ORTHOPAEDIC SURGERY | Admitting: ORTHOPAEDIC SURGERY

## 2021-11-03 ENCOUNTER — ANESTHESIA EVENT (OUTPATIENT)
Dept: PERIOP | Facility: HOSPITAL | Age: 56
End: 2021-11-03

## 2021-11-03 ENCOUNTER — APPOINTMENT (OUTPATIENT)
Dept: ULTRASOUND IMAGING | Facility: HOSPITAL | Age: 56
End: 2021-11-03

## 2021-11-03 ENCOUNTER — ANESTHESIA (OUTPATIENT)
Dept: PERIOP | Facility: HOSPITAL | Age: 56
End: 2021-11-03

## 2021-11-03 ENCOUNTER — APPOINTMENT (OUTPATIENT)
Dept: GENERAL RADIOLOGY | Facility: HOSPITAL | Age: 56
End: 2021-11-03

## 2021-11-03 DIAGNOSIS — I10 ESSENTIAL HYPERTENSION: Primary | ICD-10-CM

## 2021-11-03 DIAGNOSIS — M17.0 OSTEOARTHRITIS OF BOTH KNEES: ICD-10-CM

## 2021-11-03 PROBLEM — N40.0 BENIGN PROSTATIC HYPERPLASIA: Status: ACTIVE | Noted: 2021-11-03

## 2021-11-03 PROCEDURE — 0 CEFAZOLIN SODIUM-DEXTROSE 2-3 GM-%(50ML) RECONSTITUTED SOLUTION: Performed by: ORTHOPAEDIC SURGERY

## 2021-11-03 PROCEDURE — 94799 UNLISTED PULMONARY SVC/PX: CPT

## 2021-11-03 PROCEDURE — G0378 HOSPITAL OBSERVATION PER HR: HCPCS

## 2021-11-03 PROCEDURE — 25010000002 KETOROLAC TROMETHAMINE PER 15 MG: Performed by: ORTHOPAEDIC SURGERY

## 2021-11-03 PROCEDURE — 25010000002 MORPHINE PER 10 MG: Performed by: ORTHOPAEDIC SURGERY

## 2021-11-03 PROCEDURE — 25010000002 ONDANSETRON PER 1 MG: Performed by: NURSE ANESTHETIST, CERTIFIED REGISTERED

## 2021-11-03 PROCEDURE — 73560 X-RAY EXAM OF KNEE 1 OR 2: CPT

## 2021-11-03 PROCEDURE — 25010000002 ROPIVACAINE PER 1 MG: Performed by: ORTHOPAEDIC SURGERY

## 2021-11-03 PROCEDURE — 25010000002 PROPOFOL 1000 MG/100ML EMULSION: Performed by: NURSE ANESTHETIST, CERTIFIED REGISTERED

## 2021-11-03 PROCEDURE — C1889 IMPLANT/INSERT DEVICE, NOC: HCPCS | Performed by: ORTHOPAEDIC SURGERY

## 2021-11-03 PROCEDURE — C1713 ANCHOR/SCREW BN/BN,TIS/BN: HCPCS | Performed by: ORTHOPAEDIC SURGERY

## 2021-11-03 PROCEDURE — 25010000002 MIDAZOLAM PER 1MG: Performed by: NURSE ANESTHETIST, CERTIFIED REGISTERED

## 2021-11-03 PROCEDURE — 88300 SURGICAL PATH GROSS: CPT | Performed by: ORTHOPAEDIC SURGERY

## 2021-11-03 PROCEDURE — C1776 JOINT DEVICE (IMPLANTABLE): HCPCS | Performed by: ORTHOPAEDIC SURGERY

## 2021-11-03 PROCEDURE — L1830 KO IMMOB CANVAS LONG PRE OTS: HCPCS | Performed by: ORTHOPAEDIC SURGERY

## 2021-11-03 PROCEDURE — 99214 OFFICE O/P EST MOD 30 MIN: CPT | Performed by: INTERNAL MEDICINE

## 2021-11-03 DEVICE — DEV CONTRL TISS STRATAFIX SYMM PDS PLUS VIL CT-1 45CM: Type: IMPLANTABLE DEVICE | Site: KNEE | Status: FUNCTIONAL

## 2021-11-03 DEVICE — GENESIS II NON-POROUS TIBIAL                                    BASEPLATE SIZE 5 LEFT
Type: IMPLANTABLE DEVICE | Site: TIBIA | Status: FUNCTIONAL
Brand: GENESIS II

## 2021-11-03 DEVICE — CMT BONE SIMPLEX/P FULL DOSE 10/PK: Type: IMPLANTABLE DEVICE | Site: KNEE | Status: FUNCTIONAL

## 2021-11-03 DEVICE — GEN II 7.5MM RESUR PAT 32MM
Type: IMPLANTABLE DEVICE | Site: PATELLA | Status: FUNCTIONAL
Brand: GENESIS II

## 2021-11-03 DEVICE — DEV CONTRL TISS STRATAFIX SPIRAL MNCRYL UD 3/0 PLS 60CM: Type: IMPLANTABLE DEVICE | Site: KNEE | Status: FUNCTIONAL

## 2021-11-03 DEVICE — LEGION HIGHLY CROSS LINKED                                    POLYETHYLENE DISHED INSERT SIZE 5-6 9MM
Type: IMPLANTABLE DEVICE | Site: KNEE | Status: FUNCTIONAL
Brand: LEGION

## 2021-11-03 DEVICE — LEGION CRUCIATE RETAINING OXINIUM                                    FEMORAL SIZE 5 LEFT
Type: IMPLANTABLE DEVICE | Site: FEMUR | Status: FUNCTIONAL
Brand: LEGION

## 2021-11-03 DEVICE — IMPLANTABLE DEVICE: Type: IMPLANTABLE DEVICE | Site: FEMUR | Status: FUNCTIONAL

## 2021-11-03 RX ORDER — OXYCODONE HYDROCHLORIDE AND ACETAMINOPHEN 5; 325 MG/1; MG/1
1 TABLET ORAL EVERY 4 HOURS PRN
Status: DISCONTINUED | OUTPATIENT
Start: 2021-11-03 | End: 2021-11-05 | Stop reason: HOSPADM

## 2021-11-03 RX ORDER — ONDANSETRON 4 MG/1
4 TABLET, FILM COATED ORAL EVERY 6 HOURS PRN
Status: DISCONTINUED | OUTPATIENT
Start: 2021-11-03 | End: 2021-11-05 | Stop reason: HOSPADM

## 2021-11-03 RX ORDER — MORPHINE SULFATE 2 MG/ML
2 INJECTION, SOLUTION INTRAMUSCULAR; INTRAVENOUS
Status: DISCONTINUED | OUTPATIENT
Start: 2021-11-03 | End: 2021-11-03 | Stop reason: HOSPADM

## 2021-11-03 RX ORDER — MORPHINE SULFATE 2 MG/ML
6 INJECTION, SOLUTION INTRAMUSCULAR; INTRAVENOUS
Status: DISCONTINUED | OUTPATIENT
Start: 2021-11-03 | End: 2021-11-05 | Stop reason: HOSPADM

## 2021-11-03 RX ORDER — BUPIVACAINE HCL/0.9 % NACL/PF 0.125 %
4-14 PREFILLED PUMP RESERVOIR EPIDURAL CONTINUOUS
Status: DISCONTINUED | OUTPATIENT
Start: 2021-11-03 | End: 2021-11-05 | Stop reason: HOSPADM

## 2021-11-03 RX ORDER — ONDANSETRON 2 MG/ML
4 INJECTION INTRAMUSCULAR; INTRAVENOUS EVERY 6 HOURS PRN
Status: DISCONTINUED | OUTPATIENT
Start: 2021-11-03 | End: 2021-11-05 | Stop reason: HOSPADM

## 2021-11-03 RX ORDER — ONDANSETRON 2 MG/ML
4 INJECTION INTRAMUSCULAR; INTRAVENOUS ONCE AS NEEDED
Status: COMPLETED | OUTPATIENT
Start: 2021-11-03 | End: 2021-11-03

## 2021-11-03 RX ORDER — ALBUTEROL SULFATE 2.5 MG/3ML
2.5 SOLUTION RESPIRATORY (INHALATION) EVERY 6 HOURS PRN
Status: DISCONTINUED | OUTPATIENT
Start: 2021-11-03 | End: 2021-11-05 | Stop reason: HOSPADM

## 2021-11-03 RX ORDER — CEFAZOLIN SODIUM 2 G/50ML
2 SOLUTION INTRAVENOUS ONCE
Status: COMPLETED | OUTPATIENT
Start: 2021-11-03 | End: 2021-11-03

## 2021-11-03 RX ORDER — AMLODIPINE BESYLATE 5 MG/1
10 TABLET ORAL DAILY
Status: DISCONTINUED | OUTPATIENT
Start: 2021-11-03 | End: 2021-11-05 | Stop reason: HOSPADM

## 2021-11-03 RX ORDER — SODIUM CHLORIDE, SODIUM LACTATE, POTASSIUM CHLORIDE, CALCIUM CHLORIDE 600; 310; 30; 20 MG/100ML; MG/100ML; MG/100ML; MG/100ML
1000 INJECTION, SOLUTION INTRAVENOUS CONTINUOUS
Status: DISCONTINUED | OUTPATIENT
Start: 2021-11-03 | End: 2021-11-04

## 2021-11-03 RX ORDER — NALOXONE HCL 0.4 MG/ML
0.4 VIAL (ML) INJECTION
Status: DISCONTINUED | OUTPATIENT
Start: 2021-11-03 | End: 2021-11-05 | Stop reason: HOSPADM

## 2021-11-03 RX ORDER — LIDOCAINE HYDROCHLORIDE 5 MG/ML
INJECTION, SOLUTION INFILTRATION; INTRAVENOUS
Status: DISCONTINUED | OUTPATIENT
Start: 2021-11-03 | End: 2021-11-03 | Stop reason: SURG

## 2021-11-03 RX ORDER — CELECOXIB 100 MG/1
200 CAPSULE ORAL ONCE
Status: COMPLETED | OUTPATIENT
Start: 2021-11-03 | End: 2021-11-03

## 2021-11-03 RX ORDER — SODIUM CHLORIDE 9 MG/ML
100 INJECTION, SOLUTION INTRAVENOUS CONTINUOUS
Status: DISCONTINUED | OUTPATIENT
Start: 2021-11-03 | End: 2021-11-04

## 2021-11-03 RX ORDER — OXYCODONE HYDROCHLORIDE AND ACETAMINOPHEN 5; 325 MG/1; MG/1
2 TABLET ORAL EVERY 4 HOURS PRN
Status: DISCONTINUED | OUTPATIENT
Start: 2021-11-03 | End: 2021-11-05 | Stop reason: HOSPADM

## 2021-11-03 RX ORDER — CEFAZOLIN SODIUM 2 G/50ML
2 SOLUTION INTRAVENOUS EVERY 8 HOURS
Status: COMPLETED | OUTPATIENT
Start: 2021-11-03 | End: 2021-11-04

## 2021-11-03 RX ORDER — MEPERIDINE HYDROCHLORIDE 25 MG/ML
25 INJECTION INTRAMUSCULAR; INTRAVENOUS; SUBCUTANEOUS ONCE AS NEEDED
Status: DISCONTINUED | OUTPATIENT
Start: 2021-11-03 | End: 2021-11-03 | Stop reason: HOSPADM

## 2021-11-03 RX ORDER — DULOXETIN HYDROCHLORIDE 30 MG/1
60 CAPSULE, DELAYED RELEASE ORAL DAILY
Status: DISCONTINUED | OUTPATIENT
Start: 2021-11-03 | End: 2021-11-05 | Stop reason: HOSPADM

## 2021-11-03 RX ORDER — LOSARTAN POTASSIUM 50 MG/1
100 TABLET ORAL DAILY
Status: DISCONTINUED | OUTPATIENT
Start: 2021-11-03 | End: 2021-11-05 | Stop reason: HOSPADM

## 2021-11-03 RX ORDER — KETOTIFEN FUMARATE 0.35 MG/ML
1 SOLUTION/ DROPS OPHTHALMIC 2 TIMES DAILY
Status: DISCONTINUED | OUTPATIENT
Start: 2021-11-03 | End: 2021-11-05 | Stop reason: HOSPADM

## 2021-11-03 RX ORDER — MIDAZOLAM HYDROCHLORIDE 2 MG/2ML
INJECTION, SOLUTION INTRAMUSCULAR; INTRAVENOUS AS NEEDED
Status: DISCONTINUED | OUTPATIENT
Start: 2021-11-03 | End: 2021-11-03 | Stop reason: SURG

## 2021-11-03 RX ORDER — ACETAMINOPHEN 500 MG
1000 TABLET ORAL ONCE
Status: COMPLETED | OUTPATIENT
Start: 2021-11-03 | End: 2021-11-03

## 2021-11-03 RX ORDER — MAGNESIUM HYDROXIDE 1200 MG/15ML
LIQUID ORAL AS NEEDED
Status: DISCONTINUED | OUTPATIENT
Start: 2021-11-03 | End: 2021-11-03 | Stop reason: HOSPADM

## 2021-11-03 RX ORDER — AMOXICILLIN 250 MG
2 CAPSULE ORAL 2 TIMES DAILY
Status: DISCONTINUED | OUTPATIENT
Start: 2021-11-03 | End: 2021-11-05 | Stop reason: HOSPADM

## 2021-11-03 RX ORDER — TAMSULOSIN HYDROCHLORIDE 0.4 MG/1
0.4 CAPSULE ORAL DAILY
Status: DISCONTINUED | OUTPATIENT
Start: 2021-11-03 | End: 2021-11-05 | Stop reason: HOSPADM

## 2021-11-03 RX ORDER — DOCUSATE SODIUM 100 MG/1
100 CAPSULE, LIQUID FILLED ORAL 2 TIMES DAILY PRN
Status: DISCONTINUED | OUTPATIENT
Start: 2021-11-03 | End: 2021-11-05 | Stop reason: HOSPADM

## 2021-11-03 RX ORDER — BUPIVACAINE HYDROCHLORIDE 7.5 MG/ML
INJECTION, SOLUTION EPIDURAL; RETROBULBAR
Status: COMPLETED | OUTPATIENT
Start: 2021-11-03 | End: 2021-11-03

## 2021-11-03 RX ORDER — PROPOFOL 10 MG/ML
INJECTION, EMULSION INTRAVENOUS CONTINUOUS PRN
Status: DISCONTINUED | OUTPATIENT
Start: 2021-11-03 | End: 2021-11-03 | Stop reason: SURG

## 2021-11-03 RX ADMIN — Medication 8 ML/HR: at 11:39

## 2021-11-03 RX ADMIN — CEFAZOLIN SODIUM 2 G: 2 SOLUTION INTRAVENOUS at 16:57

## 2021-11-03 RX ADMIN — CELECOXIB 200 MG: 100 CAPSULE ORAL at 08:24

## 2021-11-03 RX ADMIN — ACETAMINOPHEN 1000 MG: 500 TABLET ORAL at 08:23

## 2021-11-03 RX ADMIN — SODIUM CHLORIDE 100 ML/HR: 9 INJECTION, SOLUTION INTRAVENOUS at 11:54

## 2021-11-03 RX ADMIN — OXYCODONE HYDROCHLORIDE AND ACETAMINOPHEN 1 TABLET: 5; 325 TABLET ORAL at 16:58

## 2021-11-03 RX ADMIN — AMLODIPINE BESYLATE 10 MG: 5 TABLET ORAL at 16:58

## 2021-11-03 RX ADMIN — ONDANSETRON 4 MG: 2 INJECTION INTRAMUSCULAR; INTRAVENOUS at 11:28

## 2021-11-03 RX ADMIN — MIDAZOLAM HYDROCHLORIDE 2 MG: 1 INJECTION, SOLUTION INTRAMUSCULAR; INTRAVENOUS at 08:51

## 2021-11-03 RX ADMIN — CEFAZOLIN SODIUM 2 G: 2 SOLUTION INTRAVENOUS at 09:01

## 2021-11-03 RX ADMIN — SODIUM CHLORIDE, POTASSIUM CHLORIDE, SODIUM LACTATE AND CALCIUM CHLORIDE: 600; 310; 30; 20 INJECTION, SOLUTION INTRAVENOUS at 09:11

## 2021-11-03 RX ADMIN — LIDOCAINE HYDROCHLORIDE 10 ML: 5 INJECTION, SOLUTION INFILTRATION at 11:09

## 2021-11-03 RX ADMIN — BUPIVACAINE HYDROCHLORIDE 2 ML: 7.5 INJECTION, SOLUTION EPIDURAL; RETROBULBAR at 09:25

## 2021-11-03 RX ADMIN — LOSARTAN POTASSIUM 100 MG: 50 TABLET, FILM COATED ORAL at 16:58

## 2021-11-03 RX ADMIN — SODIUM CHLORIDE, POTASSIUM CHLORIDE, SODIUM LACTATE AND CALCIUM CHLORIDE 1000 ML: 600; 310; 30; 20 INJECTION, SOLUTION INTRAVENOUS at 08:23

## 2021-11-03 RX ADMIN — TAMSULOSIN HYDROCHLORIDE 0.4 MG: 0.4 CAPSULE ORAL at 16:58

## 2021-11-03 RX ADMIN — KETOTIFEN FUMARATE 1 DROP: 0.35 SOLUTION/ DROPS OPHTHALMIC at 16:58

## 2021-11-03 RX ADMIN — DULOXETINE HYDROCHLORIDE 60 MG: 30 CAPSULE, DELAYED RELEASE ORAL at 16:58

## 2021-11-03 RX ADMIN — SODIUM CHLORIDE 100 ML/HR: 9 INJECTION, SOLUTION INTRAVENOUS at 12:00

## 2021-11-03 RX ADMIN — PROPOFOL 150 MCG/KG/MIN: 10 INJECTION, EMULSION INTRAVENOUS at 09:01

## 2021-11-03 RX ADMIN — MINERAL OIL, PETROLATUM: 425; 573 OINTMENT OPHTHALMIC at 16:58

## 2021-11-03 RX ADMIN — SODIUM CHLORIDE, POTASSIUM CHLORIDE, SODIUM LACTATE AND CALCIUM CHLORIDE: 600; 310; 30; 20 INJECTION, SOLUTION INTRAVENOUS at 10:32

## 2021-11-03 NOTE — ANESTHESIA PREPROCEDURE EVALUATION
Anesthesia Evaluation     Patient summary reviewed and Nursing notes reviewed   no history of anesthetic complications:  NPO Solid Status: > 8 hours  NPO Liquid Status: > 8 hours           Airway   Mallampati: I  TM distance: >3 FB  Neck ROM: full  no difficulty expected  Dental - normal exam     Pulmonary - normal exam   (+) pneumonia , COPD, sleep apnea,   Cardiovascular - normal exam    (+) hypertension, hyperlipidemia,       Neuro/Psych- negative ROS  GI/Hepatic/Renal/Endo - negative ROS     Musculoskeletal     Abdominal    Substance History - negative use     OB/GYN negative ob/gyn ROS         Other   arthritis,                      Anesthesia Plan    ASA 3     spinal and regional     intravenous induction     Anesthetic plan, all risks, benefits, and alternatives have been provided, discussed and informed consent has been obtained with: patient.

## 2021-11-03 NOTE — THERAPY EVALUATION
Patient Name: Darci Vargas  : 1965    MRN: [id number]                              Today's Date: 11/3/2021       Admit Date: 11/3/2021    Visit Dx:     ICD-10-CM ICD-9-CM   1. Osteoarthritis of both knees  M17.0 715.96     Patient Active Problem List   Diagnosis   • Osteoarthritis of both knees   • Essential hypertension   • Benign prostatic hyperplasia     Past Medical History:   Diagnosis Date   • BPH (benign prostatic hyperplasia)    • COPD (chronic obstructive pulmonary disease) (HCC)    • COVID-19 2020   • COVID-19 vaccine series completed     moderna   • Gallstones    • High cholesterol    • Hypertension    • Impaired functional mobility, balance, gait, and endurance    • Osteoarthrosis    • Pancreatitis    • Pneumonia    • RA (rheumatoid arthritis) ([facility])    • Sleep apnea     Patient does not use CPAP   • Tobacco abuse    • Wears glasses      Past Surgical History:   Procedure Laterality Date   • CHOLECYSTECTOMY     • COLONOSCOPY        General Information     Row Name 21 170          Physical Therapy Time and Intention    Document Type evaluation  -JR     Mode of Treatment physical therapy  -     Row Name 21 170          General Information    Patient Profile Reviewed yes  -JR     Prior Level of Function independent:; community mobility  -JR     Existing Precautions/Restrictions fall  -JR     Barriers to Rehab none identified  -JR     Row Name 21 170          Living Environment    Lives With spouse  -JR     Row Name 21 170          Home Main Entrance    Number of Stairs, Main Entrance --  ramp  -JR     Row Name 21 170          Stairs Within Home, Primary    Number of Stairs, Within Home, Primary none  -JR     Row Name 21 170          Cognition    Orientation Status (Cognition) oriented x 4  -JR     Row Name 21 170          Safety Issues, Functional Mobility    Safety Issues Affecting Function (Mobility) safety precautions follow-through/compliance;  insight into deficits/self-awareness  -     Impairments Affecting Function (Mobility) strength; range of motion (ROM); balance; endurance/activity tolerance  -           User Key  (r) = Recorded By, (t) = Taken By, (c) = Cosigned By    Initials Name Provider Type     Nica Morgan, PT Physical Therapist               Mobility     Row Name 11/03/21 1705          Bed Mobility    Bed Mobility scooting/bridging; supine-sit; sit-supine  -JR     Scooting/Bridging Terrell (Bed Mobility) contact guard  -     Supine-Sit Terrell (Bed Mobility) contact guard  -JR     Sit-Supine Terrell (Bed Mobility) contact guard  -     Assistive Device (Bed Mobility) head of bed elevated; bed rails  -     Row Name 11/03/21 1705          Sit-Stand Transfer    Sit-Stand Terrell (Transfers) minimum assist (75% patient effort)  -     Assistive Device (Sit-Stand Transfers) walker, front-wheeled  -     Row Name 11/03/21 1705          Gait/Stairs (Locomotion)    Terrell Level (Gait) minimum assist (75% patient effort)  -     Assistive Device (Gait) walker, front-wheeled  -     Distance in Feet (Gait) 2 steps toward the head of the bed  -     Deviations/Abnormal Patterns (Gait) antalgic; base of support, wide; festinating/shuffling; gait speed decreased; stride length decreased  -           User Key  (r) = Recorded By, (t) = Taken By, (c) = Cosigned By    Initials Name Provider Type    Nica Ramirez, PT Physical Therapist               Obj/Interventions     Row Name 11/03/21 1705          Range of Motion Comprehensive    Comment, General Range of Motion left knee =10 to 80 degrees  -     Row Name 11/03/21 1705          Strength Comprehensive (MMT)    Comment, General Manual Muscle Testing (MMT) Assessment LLE=2/5  -     Row Name 11/03/21 1705          Balance    Balance Assessment sitting static balance; sitting dynamic balance; standing static balance; standing dynamic balance  -     Static  Sitting Balance WFL  -JR     Dynamic Sitting Balance WFL  -JR     Static Standing Balance WFL  -JR     Dynamic Standing Balance mild impairment  -JR           User Key  (r) = Recorded By, (t) = Taken By, (c) = Cosigned By    Initials Name Provider Type    Nica Ramirez, PT Physical Therapist               Goals/Plan     Row Name 11/03/21 1705          Bed Mobility Goal 1 (PT)    Activity/Assistive Device (Bed Mobility Goal 1, PT) bed mobility activities, all  -JR     Bradley Level/Cues Needed (Bed Mobility Goal 1, PT) modified independence  -JR     Time Frame (Bed Mobility Goal 1, PT) 3 days  -JR     Progress/Outcomes (Bed Mobility Goal 1, PT) goal ongoing  -JR     Row Name 11/03/21 1705          Transfer Goal 1 (PT)    Activity/Assistive Device (Transfer Goal 1, PT) sit-to-stand/stand-to-sit; bed-to-chair/chair-to-bed  -JR     Bradley Level/Cues Needed (Transfer Goal 1, PT) supervision required  -JR     Time Frame (Transfer Goal 1, PT) 4 days  -JR     Progress/Outcome (Transfer Goal 1, PT) goal ongoing  -JR     Row Name 11/03/21 1705          Gait Training Goal 1 (PT)    Activity/Assistive Device (Gait Training Goal 1, PT) gait (walking locomotion); walker, rolling  -JR     Bradley Level (Gait Training Goal 1, PT) supervision required  -JR     Distance (Gait Training Goal 1, PT) 250  -JR     Time Frame (Gait Training Goal 1, PT) 4 days  -JR     Progress/Outcome (Gait Training Goal 1, PT) goal ongoing  -JR     Row Name 11/03/21 1705          Patient Education Goal (PT)    Activity (Patient Education Goal, PT) Perform left knee exercise x 15 reps  -JR     Bradley/Cues/Accuracy (Memory Goal 2, PT) demonstrates adequately  -JR     Time Frame (Patient Education Goal, PT) 2 days  -JR     Progress/Outcome (Patient Education Goal, PT) goal ongoing  -JR           User Key  (r) = Recorded By, (t) = Taken By, (c) = Cosigned By    Initials Name Provider Type    Nica Ramirez, PT Physical Therapist                Clinical Impression     Row Name 11/03/21 1705          Pain    Additional Documentation Pain Scale: Numbers Pre/Post-Treatment (Group)  -JR     Row Name 11/03/21 1705          Pain Scale: Numbers Pre/Post-Treatment    Pretreatment Pain Rating 5/10  -JR     Posttreatment Pain Rating 3/10  -JR     Pain Location - Side Left  -JR     Pain Location knee  -JR     Pain Intervention(s) Repositioned; Ambulation/increased activity  -JR     Row Name 11/03/21 1705          Plan of Care Review    Plan of Care Reviewed With patient  -JR     Progress no change  -     Outcome Summary Patient participates well in PT evaluation and demonstrates decreased strength, AROM, transfers, balance and gait.  He is able to perform bed mobility with CGA  and transfers and gait with minimal assistance and RW.  Patient is expected to benefit from additional PT services while hospitalized and upon discharge with home health care or outpatient PT.  -     Row Name 11/03/21 1705          Therapy Assessment/Plan (PT)    Patient/Family Therapy Goals Statement (PT) Patient wants to go home tomorrow  -     Rehab Potential (PT) good, to achieve stated therapy goals  -     Criteria for Skilled Interventions Met (PT) yes; meets criteria; skilled treatment is necessary  -           User Key  (r) = Recorded By, (t) = Taken By, (c) = Cosigned By    Initials Name Provider Type    JR Nica Morgan, PT Physical Therapist               Outcome Measures     Row Name 11/03/21 1705          How much help from another person do you currently need...    Turning from your back to your side while in flat bed without using bedrails? 3  -JR     Moving from lying on back to sitting on the side of a flat bed without bedrails? 3  -JR     Moving to and from a bed to a chair (including a wheelchair)? 3  -JR     Standing up from a chair using your arms (e.g., wheelchair, bedside chair)? 3  -JR     Climbing 3-5 steps with a railing? 2  -JR     To walk in  hospital room? 3  -     AM-PAC 6 Clicks Score (PT) 17  -     Row Name 11/03/21 1705          Functional Assessment    Outcome Measure Options AM-PAC 6 Clicks Basic Mobility (PT)  -           User Key  (r) = Recorded By, (t) = Taken By, (c) = Cosigned By    Initials Name Provider Type    Nica Ramirez PT Physical Therapist                             Physical Therapy Education                 Title: PT OT SLP Therapies (In Progress)     Topic: Physical Therapy (In Progress)     Point: Mobility training (Done)     Learning Progress Summary           Patient Acceptance, E,TB, VU by  at 11/3/2021 1821    Comment: Role of PT and POC                   Point: Home exercise program (Not Started)     Learner Progress:  Not documented in this visit.          Point: Body mechanics (Not Started)     Learner Progress:  Not documented in this visit.          Point: Precautions (Not Started)     Learner Progress:  Not documented in this visit.                      User Key     Initials Effective Dates Name Provider Type Discipline     06/16/21 -  Nica Morgan PT Physical Therapist PT              PT Recommendation and Plan  Planned Therapy Interventions (PT): strengthening, balance training, bed mobility training, transfer training, gait training, home exercise program, patient/family education  Plan of Care Reviewed With: patient  Progress: no change  Outcome Summary: Patient participates well in PT evaluation and demonstrates decreased strength, AROM, transfers, balance and gait.  He is able to perform bed mobility with CGA  and transfers and gait with minimal assistance and RW.  Patient is expected to benefit from additional PT services while hospitalized and upon discharge with home health care or outpatient PT.     Time Calculation:    PT Charges     Row Name 11/03/21 1823             Time Calculation    Start Time 1705  -      PT Received On 11/03/21  -      PT Goal Re-Cert Due Date 11/13/21  -               Untimed Charges    PT Eval/Re-eval Minutes 45  -JR              Total Minutes    Untimed Charges Total Minutes 45  -JR       Total Minutes 45  -JR            User Key  (r) = Recorded By, (t) = Taken By, (c) = Cosigned By    Initials Name Provider Type    Nica Ramirez, PT Physical Therapist                  PT G-Codes  Outcome Measure Options: AM-PAC 6 Clicks Basic Mobility (PT)  AM-PAC 6 Clicks Score (PT): 17    Nica Morgan, PT  11/3/2021

## 2021-11-03 NOTE — OP NOTE
[facility] [address], [address]  [place]  [address] [phone]      OPERATIVE REPORT      PATIENT NAME:  Darci Vargas                            YOB: 1965                     PREOP DIAGNOSIS: Left  knee advanced degenerative joint disease    POSTOP DIAGNOSIS: Same.    PROCEDURE:  Left  total knee replacement    SURGEON:   Sixto Suárez m.d.    FIRST ASSIST:   Circulator: Shante Roy RN; Lashanda Lay RN  Scrub Person: Arnie Barr CSA; Brisa Martell Technician: Roselyn Dinero PCT    ANESTHETIST:  CRNA: Moshe Melara CRNA; Tommy Ramirez CRNA    ANESTHESIA:   General      ESTIM BLOOD LOSS: minimal      SPECIMENS:   Knee bone cuts sent to pathology    DRAINS:   Gruber to gravity      HARDWARE: [employer] size 5 CR femur 32 mm thin patella 9 mm dished poly and a left size 5 tibia    FINDINGS:                             See dictation    COMPLICATIONS:  None    DISPOSITION:  Stable to recovery.     INDICATIONS AND NARRATIVE:  Patient present for elective knee arthroplasty to address painful intractable advanced degenerative joint disease with knee swelling, stiffness, deformity, instability and dysfunction.  The patient presents for planned elective total knee replacement.  Risks and benefits of the surgery were discussed and informed consent was obtained with the patient.  Risks discussed including but not limited to anesthesia, infection, nerve/vessel/tendon injury, fracture, prosthetic loosening or wear, blood loss and possible need for transfusion, DVT and pulmonary embolus.  Goals include pain relief, improved knee alignment, improved knee range of motion and better function for ambulation and activities.      Antibiotic prophylaxis was given.  Tranexamic acid protocol followed.  Surgeon site marking and a time out were performed prior to the procedure.  Anesthesia was effective and well tolerated.  The knee was prepped and  draped in the usual sterile fashion.  A padded thigh tourniquet was placed for the procedure.    After sterile prep and drape, an anterior incision was made at the knee.  A medial para-patella arthrotomy was made and dissection continued proximally along the medial one-third of the quadriceps tendon and distally along the medial border of the patella tendon.  A partial inferior patella fat pad release was performed as well as a superior synovectomy.   Next, steps were taken to prepare the femoral, tibial and patella surfaces for the knee replacement implant.   The guidepin was placed in the distal femur in the appropriate position 1 centimeter similar proximal to the i notch the Ortho align system was then utilized and calibrated.  The distal femoral cut was planned with 0 varus valgus and 1 degree flexion.  The distal cut was then made after utilization of the guide to take 11 mm off of the medial femoral condyle.  The calibration was then performed for sizing of the distal femoral implant.  It was appropriately sized in position with 3° of external rotation the 4-in-1 guide was then utilized to make anterior and posterior cuts as well as anterior posterior chamfer cuts.  Attention was turned to the tibia.  The extra medullary guide was utilized with the g2Onec system it was calibrated off the medial and lateral malleolus.  Drop cassie was utilized to ensure adequate positioning.  The guide was utilized proximally to take off 2 mm off the medial side of the proximal tibia.  The proximal tibial cut was made bone was removed.  The tibial sizing guide was utilized it was properly positioned.  In place again drop cassie was utilized for adequate positioning the reamer and then keel was punched.  The trials were then performed looking for adequate extension and flexion with symmetric gap balancing.  Attention was then turned to the patellar with where using the patellar guide the patella was resected leaving 14 mm of  patella remaining it was then appropriately positioned and sized with the sizing guide.  The trial was then performed with the patella adequate patellofemoral tracking was noted      Next, using standard cement technique including vacuum mixing, centrifugation and pressurization, the actual prosthetic components as described above were cemented in place.  Again, a trial liner was placed to check for the best range of motion and stability of the knee.  The trial was removed and the actual polyethylene liner was placed onto the tibial tray and secured with the locking mechanism.  The knee was again taken through a final range of motion, alignment and stability check which was excellent.  There was also excellent patella tracking through the entire range of motion. The knee and leg showed good alignment.   The wound was irrigated copiously again as well as throughout the procedure and during closure with pulse lavage antibiotic irrigation.  Routine closure was performed consisting of interrupted figure-of-eight #2 ticron  for the extensor mechanism, 2-0 Vicryl for deep and superficial subcutaneous layers and skin staples.  A sterile xeroform, gauze and Covaderm dressing was applied.  Anesthesia was effective and well tolerated.  There were no complications with the procedure.  The patient was transferred stable to recovery.      Manuel Suárez MD  11/3/2021  10:32 EDT

## 2021-11-03 NOTE — CONSULTS
[facility]      Name:  Darci Vargas   Age:  56 y.o.  Sex:  male  :  1965  MRN:  [id number]   Visit Number:  [id number]  Admission Date:  11/3/2021  Date Of Service:  21  Primary Care Physician:  Margarita Parra MD    Consulting Physician:    Vladimir Lozano MD    Referring Physician:    Manuel Suárez MD    Reason For Consult:    Medical management.    Chief Complaint:     Left knee joint pain and burning of the eyes.    History Of Presenting Illness:      Mr. Vargas is a pleasant 56-year-old male with history of hypertension, osteoarthritis of the left knee, benign prostatic hyperplasia was electively admitted by Dr. Suárez from orthopedic services for severe left knee joint osteoarthritis.  Patient underwent total knee joint arthroplasty on the left side done by Dr. Suárez today and I am seeing the patient postoperatively.  He does have history of hypertension but denies any history of diabetes.  He is currently complaining of burning in his right eye especially after the procedure when he woke up.  He denies any chest pain or shortness of breath.  Complains of pain in the left knee.  He denies any history of coronary artery disease.  He lives with his wife and is independent in daily activities.    Review Of Systems:     All systems were reviewed and negative except for: Left knee joint pain and burning of eyes.    Past Medical History: Patient  has a past medical history of BPH (benign prostatic hyperplasia), COPD (chronic obstructive pulmonary disease) ([facility]), COVID-19 (2020), COVID-19 vaccine series completed, Gallstones, High cholesterol, Hypertension, Osteoarthrosis, Pancreatitis, Pneumonia, RA (rheumatoid arthritis) ([facility]), Sleep apnea, Tobacco abuse, and Wears glasses.    Past Surgical History: Patient  has a past surgical history that includes Cholecystectomy and Colonoscopy.    Social History: Patient  reports that he quit smoking about 2 years  ago. His smoking use included cigarettes. He has a 45.00 pack-year smoking history. He has never used smokeless tobacco. He reports previous alcohol use. He reports that he does not use drugs.    Family History: Patient's family history includes Asthma in his mother; Dementia in his maternal grandmother; Heart disease in his father; Hyperlipidemia in his mother; Melanoma in his father; Sleep apnea in his father.    Allergies:      Patient has no known allergies.    Home Medications:    Prior to Admission Medications     Prescriptions Last Dose Informant Patient Reported? Taking?    albuterol sulfate  (90 Base) MCG/ACT inhaler Past Week  No Yes    Inhale 2 puffs Every 6 (Six) Hours As Needed for Wheezing.    amLODIPine (NORVASC) 10 MG tablet 11/2/2021 Self Yes Yes    Take 10 mg by mouth Daily.    Breo Ellipta 200-25 MCG/INH inhaler 11/2/2021  No Yes    Inhale 1 puff Daily.    DULoxetine (Cymbalta) 60 MG capsule Past Week  No Yes    Take 1 capsule by mouth Daily.    fluticasone (FLONASE) 50 MCG/ACT nasal spray Past Week  No Yes    SPRAY TWO (2) SPRAYS INTO EACH NOSTRIL EVERY DAY AS DIRECTED      losartan (COZAAR) 100 MG tablet 11/2/2021 Self Yes Yes    Take 100 mg by mouth Daily.    tamsulosin (FLOMAX) 0.4 MG capsule 24 hr capsule Past Week Self Yes Yes    Take 1 capsule by mouth Daily.    tiotropium bromide monohydrate (Spiriva Respimat) 2.5 MCG/ACT aerosol solution inhaler Past Week Self No Yes    Inhale 2 puffs Daily.         Medication Review:     I have reviewed the patient's active and prn medications.      Vital Signs:    Temp:  [96.9 °F (36.1 °C)-97.8 °F (36.6 °C)] 97 °F (36.1 °C)  Heart Rate:  [48-86] 70  Resp:  [14-22] 18  BP: ()/(61-84) 120/78        11/03/21  1552   Weight: 104 kg (228 lb 9.9 oz)     Body mass index is 34.76 kg/m².    Physical Exam:     General Appearance:  Alert and cooperative.   Head:  Atraumatic and normocephalic.   Eyes:  Mild conjunctival hyperemia noted, no icterus. No  pallor.   Ears:  Ears with no abnormalities noted.   Throat: No oral lesions, no thrush, oral mucosa moist.   Neck: Supple, trachea midline, no thyromegaly.   Back:   No kyphoscoliosis present. No tenderness to palpation.   Lungs:   Breath sounds heard bilaterally equally.  No crackles or wheezing. No Pleural rub or bronchial breathing.   Heart:  Normal S1 and S2, no murmur, no gallop, no rub. No JVD.   Abdomen:   Normal bowel sounds, no masses, no organomegaly. Soft, nontender, obese, no rebound tenderness.  Gruber catheter is in place.   Extremities: Supple, no edema, no cyanosis, no clubbing.  Surgical bandage noted on the left knee joint.   Pulses: Pulses palpable bilaterally.   Skin: No bleeding or rash.   Neurologic: Alert and oriented x 3. No facial asymmetry. Moves all four limbs. No tremors.      Laboratory data:    Results from last 7 days   Lab Units 11/01/21  1703   SODIUM mmol/L 139   POTASSIUM mmol/L 4.2   CHLORIDE mmol/L 101   CO2 mmol/L 30.1*   BUN mg/dL 7   CREATININE mg/dL 0.82   CALCIUM mg/dL 9.3   GLUCOSE mg/dL 95     Results from last 7 days   Lab Units 11/01/21  1703   WBC 10*3/mm3 10.08   HEMOGLOBIN g/dL 17.2   HEMATOCRIT % 52.7*   PLATELETS 10*3/mm3 370     EKG:      EKG done on 11/1/2021 was reviewed by me.  It shows sinus rhythm at 69 bpm.  Left axis deviation noted.  No significant ST-T changes were noted.    Radiology:    XR Knee 1 or 2 View Left    Result Date: 11/3/2021  PROCEDURE: XR KNEE 1 OR 2 VW LEFT-  HISTORY: Post-Op Knee Arthoplasty; M17.0-Bilateral primary osteoarthritis of knee  FINDINGS:  Two views left knee show surgical changes of arthroplasty. Hardware has an unremarkable appearance. No fracture is present.  CONCLUSION: Surgical changes of left knee arthroplasty.  This report was finalized on 11/3/2021 11:33 AM by Denny Mason MD.    XR Chest 1 View    Result Date: 11/2/2021  PROCEDURE: XR CHEST 1 VW-  HISTORY: pre-op for respiratory clearance  COMPARISON:  None  FINDINGS:   Portable view of the chest demonstrates the lungs to be grossly clear. There is no evidence of effusion, pneumothorax or other significant pleural disease. The mediastinum is unremarkable.  The heart size is normal.      Unremarkable portable chest.  This report was finalized on 11/2/2021 8:12 AM by Denny Mason MD.    Peripheral Block    Result Date: 11/3/2021  Yvrose Olvera CRNA     11/3/2021 11:20 AM Peripheral Block Pre-sedation assessment completed: 11/3/2021 10:56 AM Patient reassessed immediately prior to procedure Patient location during procedure: post-op Start time: 11/3/2021 11:01 AM Stop time: 11/3/2021 11:09 AM Reason for block: at surgeon's request and post-op pain management Performed by RACH: Yvrose Olvera CRNA Preanesthetic Checklist Completed: patient identified, IV checked, site marked, risks and benefits discussed, surgical consent, monitors and equipment checked, pre-op evaluation and timeout performed Prep: Pt Position: supine Sterile barriers:cap, gloves, mask and sterile barriers Prep: ChloraPrep Patient monitoring: blood pressure monitoring, continuous pulse oximetry and EKG Procedure Sedation: no Performed under: spinal Guidance:ultrasound guided Images:still images obtained, printed/placed on chart Laterality:left Block Type:adductor canal block Injection Technique:catheter Needle Type:Tuohy and echogenic Needle Gauge:18 G Resistance on Injection: none Catheter Size:20 G (20g) Medications Used: lidocaine PF (XYLOCAINE) injection 0.5%, 10 mL Med administered at 11/3/2021 11:09 AM Post Assessment Injection Assessment: negative aspiration for heme, incremental injection and no paresthesia on injection Patient Tolerance:comfortable throughout block Complications:no Additional Notes Procedure:         The pt was placed in the Supine position.  The Insertion site was  prepped and Draped in sterile fashion.  The pt was anesthetized with  IV Sedation( see meds).  Skin and  cutaneous tissue was infiltrated and anesthetized with 1% Lidocaine 3 mls via a 25g needle.  A  4 inch 18g echogenic needle was then  inserted approximately midline, mid-thigh and advanced In-plane with Ultrasound guidance.  Normal Saline PSF was utilized for hydrodissection of tissue.  The Vastus medialis and Sartorius muscle where visualized and the needle tip was placed in the adductor canal,  lateral to the femoral artery.  LA injection spread was visualized, injection was incremental 1-5ml, injection pressure was normal or little, no intraneural injection, no vascular injection.  LA dose was injected thru the needle(see dose above).  A  20g wire stylet catheter was placed via the needle with ultrasound visualization and confirmation with NS fluid bolus. The catheter insertion site was sealed with exofin tissue adhesive. The labeled catheter was then coiled and secured to skin with benzoin,  steristrips and CHG transparent dressing.  Appropriate labels were applied.  Thank you.     Assessment:     1.  Essential hypertension.  2.  Status post left total knee arthroplasty (POD 0).  3.  Benign prostatic hyperplasia.    Recommendations/Plan:     Patient is currently admitted to the medical floor and will be continued on bupivacaine pump for pain control.  He will be continued on IV fluids overnight.  He has already been ordered Percocet and morphine for pain control.  He does have some pain in the right eye I will place him on Patanol drops.    He will be continued on his home antihypertensive medications.  I have strongly advised him to use the incentive spirometry.  He will be continued on Lovenox for DVT prophylaxis.  Gruber catheter will be discontinued tomorrow.  I have discussed the patient's condition with his wife who is at the bedside.    Thank you for this consult. Please do not hesitate to call me for any questions.    Vladimir Lozano MD  11/03/21  16:27 EDT    Dictated utilizing Dragon dictation.

## 2021-11-03 NOTE — ANESTHESIA PROCEDURE NOTES
Spinal Block      Patient location during procedure: OR  Indication:at surgeon's request  Performed By  CRNA: Moshe Melara CRNA  Preanesthetic Checklist  Completed: patient identified, IV checked, site marked, risks and benefits discussed, surgical consent, monitors and equipment checked, pre-op evaluation and timeout performed  Spinal Block Prep:  Patient Position:sitting  Sterile Tech:cap, gloves, gown, mask and sterile barriers  Prep:Chloraprep  Patient Monitoring:blood pressure monitoring and continuous pulse oximetry  Spinal Block Procedure  Approach:midline  Guidance:palpation technique  Location:L3-L4  Needle Type:Pencan  Needle Gauge:25 G  Placement of Spinal needle event:cerebrospinal fluid aspirated    Fluid Appearance:clear  Medications: bupivacaine PF (MARCAINE) injection 0.75%, 2 mL   Post Assessment  Patient Tolerance:patient tolerated the procedure well with no apparent complications  Complications no

## 2021-11-03 NOTE — PLAN OF CARE
Goal Outcome Evaluation:  Plan of Care Reviewed With: patient        Progress: no change  Outcome Summary: Patient participates well in PT evaluation and demonstrates decreased strength, AROM, transfers, balance and gait.  He is able to perform bed mobility with CGA  and transfers and gait with minimal assistance and RW.  Patient is expected to benefit from additional PT services while hospitalized and upon discharge with home health care or outpatient PT.

## 2021-11-03 NOTE — ANESTHESIA PROCEDURE NOTES
Peripheral Block    Pre-sedation assessment completed: 11/3/2021 10:56 AM    Patient reassessed immediately prior to procedure    Patient location during procedure: post-op  Start time: 11/3/2021 11:01 AM  Stop time: 11/3/2021 11:09 AM  Reason for block: at surgeon's request and post-op pain management  Performed by  CRNA: Yvrose Olvera CRNA  Preanesthetic Checklist  Completed: patient identified, IV checked, site marked, risks and benefits discussed, surgical consent, monitors and equipment checked, pre-op evaluation and timeout performed  Prep:  Pt Position: supine  Sterile barriers:cap, gloves, mask and sterile barriers  Prep: ChloraPrep  Patient monitoring: blood pressure monitoring, continuous pulse oximetry and EKG  Procedure    Sedation: no  Performed under: spinal  Guidance:ultrasound guided  Images:still images obtained, printed/placed on chart    Laterality:left  Block Type:adductor canal block  Injection Technique:catheter  Needle Type:Tuohy and echogenic  Needle Gauge:18 G  Resistance on Injection: none  Catheter Size:20 G (20g)    Medications Used: lidocaine PF (XYLOCAINE) injection 0.5%, 10 mL  Med administered at 11/3/2021 11:09 AM      Post Assessment  Injection Assessment: negative aspiration for heme, incremental injection and no paresthesia on injection  Patient Tolerance:comfortable throughout block  Complications:no  Additional Notes  Procedure:             The pt was placed in the Supine position.  The Insertion site was  prepped and Draped in sterile fashion.  The pt was anesthetized with  IV Sedation( see meds).  Skin and cutaneous tissue was infiltrated and anesthetized with 1% Lidocaine 3 mls via a 25g needle.  A  4 inch 18g echogenic needle was then  inserted approximately midline, mid-thigh and advanced In-plane with Ultrasound guidance.  Normal Saline PSF was utilized for hydrodissection of tissue.  The Vastus medialis and Sartorius muscle where visualized and the needle tip was  placed in the adductor canal,  lateral to the femoral artery.  LA injection spread was visualized, injection was incremental 1-5ml, injection pressure was normal or little, no intraneural injection, no vascular injection.  LA dose was injected thru the needle(see dose above).  A  20g wire stylet catheter was placed via the needle with ultrasound visualization and confirmation with NS fluid bolus. The catheter insertion site was sealed with exofin tissue adhesive. The labeled catheter was then coiled and secured to skin with benzoin,  steristrips and CHG transparent dressing.  Appropriate labels were applied.  Thank you.

## 2021-11-03 NOTE — ANESTHESIA POSTPROCEDURE EVALUATION
Patient: Darci Vargas    Procedure Summary     Date: 11/03/21 Room / Location: [facility] OR  /  [facility]    Anesthesia Start: 0851 Anesthesia Stop: 1042    Procedure: TOTAL KNEE ARTHROPLASTY (Left Knee) Diagnosis:       Osteoarthritis of both knees      (Osteoarthritis of both knees [M17.0])    Surgeons: Manuel Suárez MD Provider: Moshe Melara CRNA    Anesthesia Type: spinal, regional ASA Status: 3          Anesthesia Type: spinal, regional    Vitals  Vitals Value Taken Time   /71 11/03/21 1506   Temp 96.9 °F (36.1 °C) 11/03/21 1136   Pulse 82 11/03/21 1530   Resp 18 11/03/21 1506   SpO2 97 % 11/03/21 1530   Vitals shown include unvalidated device data.        Post Anesthesia Care and Evaluation    Patient location during evaluation: bedside  Patient participation: complete - patient participated  Level of consciousness: awake  Pain score: 0  Pain management: adequate  Airway patency: patent  Anesthetic complications: No anesthetic complications  PONV Status: controlled  Cardiovascular status: acceptable and stable  Respiratory status: acceptable and room air  Hydration status: acceptable

## 2021-11-03 NOTE — PLAN OF CARE
Goal Outcome Evaluation:  Plan of Care Reviewed With: patient        Progress: improving  Outcome Summary: New admit s/p left total knee replacement today. VSS. Surgical dressing to left knee CDI. On-Q pump intact. Encouraged incentive spirometer use. Out of bed with PT this evening.

## 2021-11-04 LAB
ANION GAP SERPL CALCULATED.3IONS-SCNC: 3.8 MMOL/L (ref 5–15)
BUN SERPL-MCNC: 7 MG/DL (ref 6–20)
BUN/CREAT SERPL: 11.1 (ref 7–25)
CALCIUM SPEC-SCNC: 8.4 MG/DL (ref 8.6–10.5)
CHLORIDE SERPL-SCNC: 102 MMOL/L (ref 98–107)
CO2 SERPL-SCNC: 28.2 MMOL/L (ref 22–29)
CREAT SERPL-MCNC: 0.63 MG/DL (ref 0.76–1.27)
GFR SERPL CREATININE-BSD FRML MDRD: 131 ML/MIN/1.73
GLUCOSE SERPL-MCNC: 117 MG/DL (ref 65–99)
HCT VFR BLD AUTO: 44.8 % (ref 37.5–51)
HGB BLD-MCNC: 15 G/DL (ref 13–17.7)
POTASSIUM SERPL-SCNC: 3.8 MMOL/L (ref 3.5–5.2)
QT INTERVAL: 384 MS
QTC INTERVAL: 411 MS
SODIUM SERPL-SCNC: 134 MMOL/L (ref 136–145)

## 2021-11-04 PROCEDURE — G0378 HOSPITAL OBSERVATION PER HR: HCPCS

## 2021-11-04 PROCEDURE — 25010000002 MORPHINE SULFATE (PF) 2 MG/ML SOLUTION: Performed by: ORTHOPAEDIC SURGERY

## 2021-11-04 PROCEDURE — 99203 OFFICE O/P NEW LOW 30 MIN: CPT | Performed by: INTERNAL MEDICINE

## 2021-11-04 PROCEDURE — 85018 HEMOGLOBIN: CPT | Performed by: ORTHOPAEDIC SURGERY

## 2021-11-04 PROCEDURE — 97110 THERAPEUTIC EXERCISES: CPT

## 2021-11-04 PROCEDURE — 97116 GAIT TRAINING THERAPY: CPT

## 2021-11-04 PROCEDURE — 25010000002 ENOXAPARIN PER 10 MG: Performed by: ORTHOPAEDIC SURGERY

## 2021-11-04 PROCEDURE — 0 CEFAZOLIN SODIUM-DEXTROSE 2-3 GM-%(50ML) RECONSTITUTED SOLUTION: Performed by: ORTHOPAEDIC SURGERY

## 2021-11-04 PROCEDURE — 85014 HEMATOCRIT: CPT | Performed by: ORTHOPAEDIC SURGERY

## 2021-11-04 PROCEDURE — 97165 OT EVAL LOW COMPLEX 30 MIN: CPT

## 2021-11-04 PROCEDURE — 94799 UNLISTED PULMONARY SVC/PX: CPT

## 2021-11-04 PROCEDURE — 94640 AIRWAY INHALATION TREATMENT: CPT

## 2021-11-04 PROCEDURE — 80048 BASIC METABOLIC PNL TOTAL CA: CPT | Performed by: ORTHOPAEDIC SURGERY

## 2021-11-04 RX ADMIN — ENOXAPARIN SODIUM 30 MG: 30 INJECTION SUBCUTANEOUS at 08:48

## 2021-11-04 RX ADMIN — OXYCODONE HYDROCHLORIDE AND ACETAMINOPHEN 1 TABLET: 5; 325 TABLET ORAL at 08:47

## 2021-11-04 RX ADMIN — DOCUSATE SODIUM 50MG AND SENNOSIDES 8.6MG 2 TABLET: 8.6; 5 TABLET, FILM COATED ORAL at 08:47

## 2021-11-04 RX ADMIN — CEFAZOLIN SODIUM 2 G: 2 SOLUTION INTRAVENOUS at 01:53

## 2021-11-04 RX ADMIN — OXYCODONE HYDROCHLORIDE AND ACETAMINOPHEN 1 TABLET: 5; 325 TABLET ORAL at 02:53

## 2021-11-04 RX ADMIN — KETOTIFEN FUMARATE 1 DROP: 0.35 SOLUTION/ DROPS OPHTHALMIC at 21:44

## 2021-11-04 RX ADMIN — SODIUM CHLORIDE 100 ML/HR: 9 INJECTION, SOLUTION INTRAVENOUS at 10:59

## 2021-11-04 RX ADMIN — MORPHINE SULFATE 6 MG: 2 INJECTION, SOLUTION INTRAMUSCULAR; INTRAVENOUS at 11:00

## 2021-11-04 RX ADMIN — OXYCODONE HYDROCHLORIDE AND ACETAMINOPHEN 1 TABLET: 5; 325 TABLET ORAL at 22:43

## 2021-11-04 RX ADMIN — KETOTIFEN FUMARATE 1 DROP: 0.35 SOLUTION/ DROPS OPHTHALMIC at 08:48

## 2021-11-04 RX ADMIN — TIOTROPIUM BROMIDE INHALATION SPRAY 2 PUFF: 3.12 SPRAY, METERED RESPIRATORY (INHALATION) at 07:24

## 2021-11-04 RX ADMIN — LOSARTAN POTASSIUM 100 MG: 50 TABLET, FILM COATED ORAL at 08:47

## 2021-11-04 RX ADMIN — AMLODIPINE BESYLATE 10 MG: 5 TABLET ORAL at 08:47

## 2021-11-04 RX ADMIN — DULOXETINE HYDROCHLORIDE 60 MG: 30 CAPSULE, DELAYED RELEASE ORAL at 08:47

## 2021-11-04 RX ADMIN — TAMSULOSIN HYDROCHLORIDE 0.4 MG: 0.4 CAPSULE ORAL at 08:47

## 2021-11-04 NOTE — THERAPY EVALUATION
Patient Name: Darci Vargas  : 1965    MRN: [id number]                              Today's Date: 2021       Admit Date: 11/3/2021    Visit Dx:     ICD-10-CM ICD-9-CM   1. Osteoarthritis of both knees  M17.0 715.96     Patient Active Problem List   Diagnosis   • Osteoarthritis of both knees   • Essential hypertension   • Benign prostatic hyperplasia     Past Medical History:   Diagnosis Date   • BPH (benign prostatic hyperplasia)    • COPD (chronic obstructive pulmonary disease) (HCC)    • COVID-19 2020   • COVID-19 vaccine series completed     moderna   • Gallstones    • High cholesterol    • Hypertension    • Impaired functional mobility, balance, gait, and endurance    • Osteoarthrosis    • Pancreatitis    • Pneumonia    • RA (rheumatoid arthritis) (Formerly [facility])    • Sleep apnea     Patient does not use CPAP   • Tobacco abuse    • Wears glasses      Past Surgical History:   Procedure Laterality Date   • CHOLECYSTECTOMY     • COLONOSCOPY     • TOTAL KNEE ARTHROPLASTY Left 11/3/2021    Procedure: TOTAL KNEE ARTHROPLASTY;  Surgeon: Manuel Suárez MD;  Location: [facility];  Service: Orthopedics;  Laterality: Left;      General Information     Row Name 21 1250          OT Time and Intention    Document Type evaluation (P)   -SP     Mode of Treatment occupational therapy (P)   -SP     Row Name 21 1250          General Information    Patient Profile Reviewed yes (P)   -SP     Prior Level of Function independent:; all household mobility; community mobility; ADL's (P)   Modified I with ADLs. Pt states he needs increased time and modified techniques for LBD and LB bathing  -SP     Existing Precautions/Restrictions fall (P)   -SP     Barriers to Rehab previous functional deficit (P)   B knee and back pain  -SP     Row Name 21 1250          Living Environment    Lives With spouse (P)   -SP     Row Name 21 1250          Home Main Entrance    Number of Stairs, Main Entrance other (see comments)  (P)   ramp  -SP     Row Name 11/04/21 1250          Stairs Within Home, Primary    Number of Stairs, Within Home, Primary none (P)   -SP     Row Name 11/04/21 1250          Cognition    Orientation Status (Cognition) oriented x 4 (P)   -SP     Row Name 11/04/21 1250          Safety Issues, Functional Mobility    Safety Issues Affecting Function (Mobility) safety precautions follow-through/compliance; awareness of need for assistance; insight into deficits/self-awareness (P)   -SP     Impairments Affecting Function (Mobility) endurance/activity tolerance; pain; range of motion (ROM); shortness of breath (P)   -SP           User Key  (r) = Recorded By, (t) = Taken By, (c) = Cosigned By    Initials Name Provider Type    Kia Duran OT Student OT Student                 Mobility/ADL's     Row Name 11/04/21 1255          Bed Mobility    Supine-Sit [place] (Bed Mobility) contact guard (P)   -SP     Assistive Device (Bed Mobility) head of bed elevated; bed rails (P)   -SP     Row Name 11/04/21 1255          Transfers    Transfers sit-stand transfer (P)   -SP     Sit-Stand [place] (Transfers) contact guard (P)   -SP     Row Name 11/04/21 1255          Sit-Stand Transfer    Assistive Device (Sit-Stand Transfers) walker, front-wheeled (P)   -SP     Row Name 11/04/21 1255          Functional Mobility    Functional Mobility- Ind. Level contact guard assist (P)   -SP     Functional Mobility- Device rolling walker (P)   -SP     Functional Mobility-Distance (Feet) 40 (P)   -SP     Row Name 11/04/21 1255          Activities of Daily Living    BADL Assessment/Intervention bathing; upper body dressing; lower body dressing; grooming; feeding; toileting (P)   -SP     Row Name 11/04/21 1255          Bathing Assessment/Intervention    [place] Level (Bathing) lower body; moderate assist (50% patient effort) (P)   -SP     Row Name 11/04/21 1255          Upper Body Dressing Assessment/Training    [place] Level  (Upper Body Dressing) upper body dressing skills; independent (P)   -SP     Row Name 11/04/21 1255          Lower Body Dressing Assessment/Training    [place] Level (Lower Body Dressing) lower body dressing skills; don; socks; maximum assist (25% patient effort); other (see comments) (P)   Pt in 8/10 pain sitting EOB and required max A due to pain and decreased flexibility  -SP     Position (Lower Body Dressing) edge of bed sitting (P)   -SP     Row Name 11/04/21 1255          Grooming Assessment/Training    [place] Level (Grooming) grooming skills; independent (P)   -SP     Row Name 11/04/21 1255          Self-Feeding Assessment/Training    [place] Level (Feeding) feeding skills; independent (P)   -SP     Row Name 11/04/21 1255          Toileting Assessment/Training    [place] Level (Toileting) toileting skills; minimum assist (75% patient effort) (P)   -SP           User Key  (r) = Recorded By, (t) = Taken By, (c) = Cosigned By    Initials Name Provider Type    Kia Duran OT Student OT Student               Obj/Interventions     Row Name 11/04/21 1259          Range of Motion Comprehensive    General Range of Motion bilateral upper extremity ROM WFL (P)   -SP     Row Name 11/04/21 1259          Strength Comprehensive (MMT)    General Manual Muscle Testing (MMT) Assessment upper extremity strength deficits identified (P)   -SP     Comment, General Manual Muscle Testing (MMT) Assessment Pt presented with 4/5 B flexion and extension of shoulder and elbow. Weak grasp noted B (P)   -SP     Row Name 11/04/21 1259          Balance    Balance Assessment sitting static balance; sitting dynamic balance; standing static balance; standing dynamic balance (P)   -SP     Static Sitting Balance WFL (P)   -SP     Dynamic Sitting Balance WFL (P)   -SP     Static Standing Balance WFL (P)   -SP     Dynamic Standing Balance mild impairment (P)   -SP     Balance Interventions supported (P)   -SP           User  Key  (r) = Recorded By, (t) = Taken By, (c) = Cosigned By    Initials Name Provider Type    Kia Duran OT Student OT Student               Goals/Plan     Row Name 11/04/21 1305          Bed Mobility Goal 1 (OT)    Activity/Assistive Device (Bed Mobility Goal 1, OT) bed mobility activities, all (P)   -SP     [place] Level/Cues Needed (Bed Mobility Goal 1, OT) independent (P)   -SP     Time Frame (Bed Mobility Goal 1, OT) by discharge (P)   -SP     Progress/Outcomes (Bed Mobility Goal 1, OT) goal ongoing (P)   -SP     Row Name 11/04/21 1305          Transfer Goal 1 (OT)    Activity/Assistive Device (Transfer Goal 1, OT) sit-to-stand/stand-to-sit (P)   -SP     [place] Level/Cues Needed (Transfer Goal 1, OT) modified independence (P)   -SP     Time Frame (Transfer Goal 1, OT) long term goal (LTG) (P)   -SP     Progress/Outcome (Transfer Goal 1, OT) goal ongoing (P)   -SP     Row Name 11/04/21 1305          Bathing Goal 1 (OT)    Activity/Device (Bathing Goal 1, OT) bathing skills, all; hand-held shower spray hose; long-handled sponge (P)   -SP     [place] Level/Cues Needed (Bathing Goal 1, OT) contact guard assist (P)   -SP     Time Frame (Bathing Goal 1, OT) by discharge (P)   -SP     Progress/Outcomes (Bathing Goal 1, OT) goal ongoing (P)   -SP     Row Name 11/04/21 1305          Dressing Goal 1 (OT)    Activity/Device (Dressing Goal 1, OT) dressing skills, all; sock-aid; dressing stick (P)   -SP     [place]/Cues Needed (Dressing Goal 1, OT) contact guard assist (P)   -SP     Time Frame (Dressing Goal 1, OT) by discharge (P)   -SP     Progress/Outcome (Dressing Goal 1, OT) goal ongoing (P)   -SP     Row Name 11/04/21 1305          Toileting Goal 1 (OT)    Activity/Device (Toileting Goal 1, OT) toileting skills, all (P)   -SP     [place] Level/Cues Needed (Toileting Goal 1, OT) contact guard assist (P)   -SP     Time Frame (Toileting Goal 1, OT) by discharge (P)   -SP     Progress/Outcome  (Toileting Goal 1, OT) goal ongoing (P)   -SP     Row Name 11/04/21 1305          Therapy Assessment/Plan (OT)    Planned Therapy Interventions (OT) activity tolerance training; adaptive equipment training; BADL retraining; IADL retraining; occupation/activity based interventions; patient/caregiver education/training; ROM/therapeutic exercise; strengthening exercise (P)   -SP           User Key  (r) = Recorded By, (t) = Taken By, (c) = Cosigned By    Initials Name Provider Type    SP Kia Park, OT Student OT Student               Clinical Impression     Row Name 11/04/21 1300          Pain Scale: Numbers Pre/Post-Treatment    Pretreatment Pain Rating 4/10 (P)   -SP     Posttreatment Pain Rating 5/10 (P)   -SP     Pain Location - Side Left (P)   -SP     Pain Location knee (P)   -SP     Pain Intervention(s) Ambulation/increased activity; Repositioned (P)   -SP     Row Name 11/04/21 1300          Plan of Care Review    Plan of Care Reviewed With patient; spouse (P)   -SP     Progress no change (P)   -SP     Outcome Summary Pt willing to participate in OT eval this date. Pt reports Mod I with ADLs needing increased time and modified techniques for LBD and LB bathing. Pt completed HEP in supine in bed in preperation for ambulation with pain at 6/10. Pt moved from supine to sit with CGA. Pt sat EOB x10 mins with supervision for balance. Pt attempted to don socks but required max A due to pain and decreased flexibility. Pt used FWW for sit to stand with CGA. Pt ambulated 40' around the room to bc with FWW and CGA. Pt was noted to be exhausted and pale after ambulating. Pt sat in bc and was independent for scooting to reposition. Pt will benefit from skilled OT services to address functional mobility and indepenence with ADLs until dc. (P)   -SP     Row Name 11/04/21 1300          Therapy Assessment/Plan (OT)    Rehab Potential (OT) good, to achieve stated therapy goals (P)   -SP     Criteria for Skilled Therapeutic  Interventions Met (OT) yes (P)   -SP     Therapy Frequency (OT) 3 times/wk (P)   -SP     Row Name 11/04/21 1300          Therapy Plan Review/Discharge Plan (OT)    Equipment Needs Upon Discharge (OT) bathing equipment (P)   -SP     Anticipated Discharge Disposition (OT) home with home health; home with assist (P)   -SP     Row Name 11/04/21 1300          Vital Signs    Pre SpO2 (%) 95 (P)   -SP     O2 Delivery Pre Treatment nasal cannula (P)   2L  -SP     Intra SpO2 (%) 89 (P)   -SP     O2 Delivery Intra Treatment room air (P)   -SP     Post SpO2 (%) 93 (P)   -SP     O2 Delivery Post Treatment nasal cannula (P)   -SP     Pre Patient Position Supine (P)   -SP     Intra Patient Position Standing (P)   -SP     Post Patient Position Sitting (P)   -SP     Row Name 11/04/21 1300          Positioning and Restraints    Pre-Treatment Position in bed (P)   -SP     Post Treatment Position chair (P)   -SP     In Chair reclined; call light within reach; encouraged to call for assist; with family/caregiver (P)   -SP           User Key  (r) = Recorded By, (t) = Taken By, (c) = Cosigned By    Initials Name Provider Type    Kia Duran, OT Student OT Student               Outcome Measures     Row Name 11/04/21 1308          How much help from another is currently needed...    Putting on and taking off regular lower body clothing? 2 (P)   -SP     Bathing (including washing, rinsing, and drying) 3 (P)   -SP     Toileting (which includes using toilet bed pan or urinal) 3 (P)   -SP     Putting on and taking off regular upper body clothing 4 (P)   -SP     Taking care of personal grooming (such as brushing teeth) 4 (P)   -SP     Eating meals 4 (P)   -SP     AM-PAC 6 Clicks Score (OT) 20 (P)   -SP     Row Name 11/04/21 1308          Functional Assessment    Outcome Measure Options AM-PAC 6 Clicks Daily Activity (OT) (P)   -SP           User Key  (r) = Recorded By, (t) = Taken By, (c) = Cosigned By    Initials Name Provider Type    SP  Kia Park, OT Student OT Student                Occupational Therapy Education                 Title: PT OT SLP Therapies (In Progress)     Topic: Occupational Therapy (In Progress)     Point: ADL training (Done)     Description:   Instruct learner(s) on proper safety adaptation and remediation techniques during self care or transfers.   Instruct in proper use of assistive devices.              Learning Progress Summary           Patient Acceptance, E,TB, VU by SP at 11/4/2021 3930    Comment: Pt edcuated on role of OT and HEP.                   Point: Home exercise program (Not Started)     Description:   Instruct learner(s) on appropriate technique for monitoring, assisting and/or progressing therapeutic exercises/activities.              Learner Progress:  Not documented in this visit.          Point: Precautions (Not Started)     Description:   Instruct learner(s) on prescribed precautions during self-care and functional transfers.              Learner Progress:  Not documented in this visit.          Point: Body mechanics (Not Started)     Description:   Instruct learner(s) on proper positioning and spine alignment during self-care, functional mobility activities and/or exercises.              Learner Progress:  Not documented in this visit.                      User Key     Initials Effective Dates Name Provider Type Discipline    VICTOR HUGO 06/21/21 -  Kia Park, OT Student OT Student OT              OT Recommendation and Plan  Planned Therapy Interventions (OT): (P) activity tolerance training, adaptive equipment training, BADL retraining, IADL retraining, occupation/activity based interventions, patient/caregiver education/training, ROM/therapeutic exercise, strengthening exercise  Therapy Frequency (OT): (P) 3 times/wk  Plan of Care Review  Plan of Care Reviewed With: (P) patient, spouse  Progress: (P) no change  Outcome Summary: (P) Pt willing to participate in OT eval this date. Pt reports Mod I with ADLs  needing increased time and modified techniques for LBD and LB bathing. Pt completed HEP in supine in bed in preperation for ambulation with pain at 6/10. Pt moved from supine to sit with CGA. Pt sat EOB x10 mins with supervision for balance. Pt attempted to don socks but required max A due to pain and decreased flexibility. Pt used FWW for sit to stand with CGA. Pt ambulated 40' around the room to bc with FWW and CGA. Pt was noted to be exhausted and pale after ambulating. Pt sat in bc and was independent for scooting to reposition. Pt will benefit from skilled OT services to address functional mobility and indepenence with ADLs until dc.     Time Calculation:    Time Calculation- OT     Row Name 11/04/21 1310             Time Calculation- OT    OT Start Time 0953 (P)   -SP      OT Stop Time 1048 (P)   -SP      OT Time Calculation (min) 55 min (P)   -SP      OT Received On 11/04/21 (P)   -SP      OT Goal Re-Cert Due Date 11/14/21 (P)   -SP              Untimed Charges    OT Eval/Re-eval Minutes 70 (P)   -SP              Total Minutes    Untimed Charges Total Minutes 70 (P)   -SP       Total Minutes 70 (P)   -SP            User Key  (r) = Recorded By, (t) = Taken By, (c) = Cosigned By    Initials Name Provider Type    Kia Duran OT Student OT Student              Therapy Charges for Today     Code Description Service Date Service Provider Modifiers Qty    [id number] HC OT EVAL LOW COMPLEXITY 4 11/4/2021 Kia Park OT Student GO 1               ALLY Dewitt  11/4/2021

## 2021-11-04 NOTE — PROGRESS NOTES
[facility]    Acute pain service Inpatient Progress Note    Patient Name: Darci Vargas  :  1965  MRN:  [id number]        Acute Pain  Service Inpatient Progress Note:    Analgesia:Good  Pain Score:4/10  LOC: alert and awake  Resp Status: room air  Cardiac: VS stable  Side Effects:None  Catheter Site:clean  Cath type: peripheral nerve cath with ON Q  Infusion rate: 12ml/hr  Catheter Plan:Catheter to remain Insitu  Comments: C/o muscle spasm causing discomfort around the left knee area. Rate increased with relief.

## 2021-11-04 NOTE — THERAPY TREATMENT NOTE
Patient Name: Darci Vargas  : 1965    MRN: [id number]                              Today's Date: 2021       Admit Date: 11/3/2021    Visit Dx:     ICD-10-CM ICD-9-CM   1. Osteoarthritis of both knees  M17.0 715.96     Patient Active Problem List   Diagnosis   • Osteoarthritis of both knees   • Essential hypertension   • Benign prostatic hyperplasia     Past Medical History:   Diagnosis Date   • BPH (benign prostatic hyperplasia)    • COPD (chronic obstructive pulmonary disease) (HCC)    • COVID-19 2020   • COVID-19 vaccine series completed     moderna   • Gallstones    • High cholesterol    • Hypertension    • Impaired functional mobility, balance, gait, and endurance    • Osteoarthrosis    • Pancreatitis    • Pneumonia    • RA (rheumatoid arthritis) ([facility])    • Sleep apnea     Patient does not use CPAP   • Tobacco abuse    • Wears glasses      Past Surgical History:   Procedure Laterality Date   • CHOLECYSTECTOMY     • COLONOSCOPY     • TOTAL KNEE ARTHROPLASTY Left 11/3/2021    Procedure: TOTAL KNEE ARTHROPLASTY;  Surgeon: Manuel Suárez MD;  Location: [facility];  Service: Orthopedics;  Laterality: Left;      General Information     Row Name 21 1312          Physical Therapy Time and Intention    Document Type therapy note (daily note)  -RM     Mode of Treatment physical therapy  -RM     Row Name 21 1312          General Information    Patient Profile Reviewed yes  -RM     Existing Precautions/Restrictions fall  -RM     Row Name 21 1312          Cognition    Orientation Status (Cognition) oriented x 4  -RM     Row Name 21 1312          Safety Issues, Functional Mobility    Safety Issues Affecting Function (Mobility) sequencing abilities; positioning of assistive device  -RM     Impairments Affecting Function (Mobility) strength; range of motion (ROM); balance; endurance/activity tolerance  -RM           User Key  (r) = Recorded By, (t) = Taken By, (c) = Cosigned By    Initials  Name Provider Type     Ronald Stephen, EMA Physical Therapy Assistant               Mobility     Row Name 11/04/21 1313          Bed Mobility    Supine-Sit Kimble (Bed Mobility) contact guard; verbal cues; standby assist  -RM     Assistive Device (Bed Mobility) head of bed elevated; bed rails  -     Row Name 11/04/21 1313          Sit-Stand Transfer    Sit-Stand Kimble (Transfers) contact guard; verbal cues  -RM     Assistive Device (Sit-Stand Transfers) walker, front-wheeled  -RM     Row Name 11/04/21 1313          Gait/Stairs (Locomotion)    Kimble Level (Gait) contact guard; verbal cues  -RM     Assistive Device (Gait) walker, front-wheeled  -RM     Distance in Feet (Gait) 40'  -RM     Deviations/Abnormal Patterns (Gait) antalgic; base of support, wide; gait speed decreased; stride length decreased  -RM     Bilateral Gait Deviations forward flexed posture  -RM     Left Sided Gait Deviations heel strike decreased; foot drop/toe drag; weight shift ability decreased  -RM           User Key  (r) = Recorded By, (t) = Taken By, (c) = Cosigned By    Initials Name Provider Type     Ronald Stephen, EMA Physical Therapy Assistant               Obj/Interventions     Row Name 11/04/21 1315          Motor Skills    Therapeutic Exercise hip; knee; ankle  -     Row Name 11/04/21 1315          Hip (Therapeutic Exercise)    Hip (Therapeutic Exercise) isometric exercises; strengthening exercise  -RM     Hip Isometrics (Therapeutic Exercise) gluteal sets; 10 repetitions; bilateral  -RM     Hip Strengthening (Therapeutic Exercise) left; supine; heel slides; 10 repetitions  -     Row Name 11/04/21 1315          Knee (Therapeutic Exercise)    Knee (Therapeutic Exercise) isometric exercises; strengthening exercise  -RM     Knee Isometrics (Therapeutic Exercise) left; supine; 10 repetitions; quad sets  -RM     Knee Strengthening (Therapeutic Exercise) left; SLR (straight leg raise); SAQ (short arc  quad); supine; 10 repetitions  -RM     Row Name 11/04/21 1315          Ankle (Therapeutic Exercise)    Ankle (Therapeutic Exercise) AROM (active range of motion)  -RM     Ankle AROM (Therapeutic Exercise) bilateral; dorsiflexion; plantarflexion; 10 repetitions  -RM           User Key  (r) = Recorded By, (t) = Taken By, (c) = Cosigned By    Initials Name Provider Type    Ronald Berger, EMA Physical Therapy Assistant               Goals/Plan    No documentation.                Clinical Impression     Row Name 11/04/21 1317          Pain Scale: Numbers Pre/Post-Treatment    Pretreatment Pain Rating 4/10  -RM     Posttreatment Pain Rating 5/10  -RM     Pain Location - Side Left  -RM     Pain Location knee  -RM     Row Name 11/04/21 1317          Plan of Care Review    Plan of Care Reviewed With patient; spouse  -RM     Progress improving  -RM     Outcome Summary Pt tolerated treatment well. Pt performed supine exercises with written HEP given and copy placed in chart.  Pt also was able to perform gait with cga with rw x 40'. Pt requires multiple vc's for proper gait techniques.  See flowsheet for details  -RM     Row Name 11/04/21 1317          Positioning and Restraints    Pre-Treatment Position in bed  -RM     Post Treatment Position chair  -RM     In Chair reclined; call light within reach; encouraged to call for assist; with family/caregiver; notified nsg  -RM           User Key  (r) = Recorded By, (t) = Taken By, (c) = Cosigned By    Initials Name Provider Type    Ronald Berger, EMA Physical Therapy Assistant               Outcome Measures     Row Name 11/04/21 1319          How much help from another person do you currently need...    Turning from your back to your side while in flat bed without using bedrails? 4  -RM     Moving from lying on back to sitting on the side of a flat bed without bedrails? 4  -RM     Moving to and from a bed to a chair (including a wheelchair)? 3  -RM     Standing up from  a chair using your arms (e.g., wheelchair, bedside chair)? 3  -RM     Climbing 3-5 steps with a railing? 2  -RM     To walk in hospital room? 3  -RM     AM-PAC 6 Clicks Score (PT) 19  -RM     Row Name 11/04/21 1319 11/04/21 1308       Functional Assessment    Outcome Measure Options AM-PAC 6 Clicks Basic Mobility (PT)  -RM AM-PAC 6 Clicks Daily Activity (OT) (P)   -SP          User Key  (r) = Recorded By, (t) = Taken By, (c) = Cosigned By    Initials Name Provider Type     Ronald Stephen, PTA Physical Therapy Assistant    SP Kia Park, OT Student OT Student                             Physical Therapy Education                 Title: PT OT SLP Therapies (In Progress)     Topic: Physical Therapy (In Progress)     Point: Mobility training (Done)     Learning Progress Summary           Patient Acceptance, E,TB,D,H, VU,DU by  at 11/4/2021 1320    Acceptance, E,TB, VU by  at 11/3/2021 1821    Comment: Role of PT and POC                   Point: Home exercise program (Done)     Learning Progress Summary           Patient Acceptance, E,TB,D,H, VU,DU by  at 11/4/2021 1320                   Point: Body mechanics (Not Started)     Learner Progress:  Not documented in this visit.          Point: Precautions (Not Started)     Learner Progress:  Not documented in this visit.                      User Key     Initials Effective Dates Name Provider Type Discipline     06/16/21 -  Nica Morgan, PT Physical Therapist PT     06/16/21 -  Ronald Stephen, PTA Physical Therapy Assistant PT              PT Recommendation and Plan     Plan of Care Reviewed With: patient, spouse  Progress: improving  Outcome Summary: Pt tolerated treatment well. Pt performed supine exercises with written HEP given and copy placed in chart.  Pt also was able to perform gait with cga with rw x 40'. Pt requires multiple vc's for proper gait techniques.  See flowsheet for details     Time Calculation:    PT Charges     Row Name 11/04/21  1320             Time Calculation    Start Time 0952  -RM      Stop Time 1046  -RM      Time Calculation (min) 54 min  -RM      PT Received On 11/04/21  -RM      PT Goal Re-Cert Due Date 11/13/21  -RM              Time Calculation- PT    Total Timed Code Minutes- PT 54 minute(s)  -RM              Timed Charges    89953 - PT Therapeutic Exercise Minutes 40  -RM      51329 - Gait Training Minutes  14  -RM              Total Minutes    Timed Charges Total Minutes 54  -RM       Total Minutes 54  -RM            User Key  (r) = Recorded By, (t) = Taken By, (c) = Cosigned By    Initials Name Provider Type    Ronald Berger, PTA Physical Therapy Assistant              Therapy Charges for Today     Code Description Service Date Service Provider Modifiers Qty    10012979589 HC PT THER PROC EA 15 MIN 11/4/2021 Roanld Stephen, PTA GP 3    22731902316 HC GAIT TRAINING EA 15 MIN 11/4/2021 Ronald Stephen, PTA GP 1          PT G-Codes  Outcome Measure Options: AM-PAC 6 Clicks Basic Mobility (PT)  AM-PAC 6 Clicks Score (PT): 19  AM-PAC 6 Clicks Score (OT): (P) 20    Ronald Stephen PTA  11/4/2021

## 2021-11-04 NOTE — PROGRESS NOTES
Orthopedic Total Knee Progress Note    Assessment/Plan     Status post-left total knee arthroplasty: Doing well postoperatively.    Pain Relief: Patient notes left knee pain and swelling.  He does have a peripheral nerve catheter/block in place.  This is been adjusted to anesthesia.    Continues current post-op course, With plans for discharge to home environment tomorrow if pain is well controlled.    Activity: up with assistance    Weight Bearing: WBAT     LOS: 0 days     Subjective     Post-Operative Day: 1 post-left total knee arthroplasty  Systemic or Specific Complaints: Pain Control: Fair    Objective     Vital signs in last 24 hours:  Temp:  [97 °F (36.1 °C)-97.9 °F (36.6 °C)] 97.7 °F (36.5 °C)  Heart Rate:  [62-86] 76  Resp:  [14-22] 16  BP: (112-131)/(71-83) 118/81    General: alert, appears stated age and cooperative   Neurovascular: Left lower extremity: Intact sensation.  Intact capillary refill.  Intact active range of motion of the foot and ankle.There is swelling about the left knee noted.  No calf pain or posterior thigh pain.   Wound: Bandage stable with blood stained noted.   Range of Motion: 5-40.   DVT Exam: No evidence of DVT seen on physical exam.Negative Homans.     Data Review:  CBC:  Results from last 7 days   Lab Units 11/04/21  0545 11/01/21  1703 11/01/21  1703   WBC 10*3/mm3  --   --  10.08   RBC 10*6/mm3  --   --  5.97*   HEMOGLOBIN g/dL 15.0   < > 17.2   HEMATOCRIT % 44.8   < > 52.7*   PLATELETS 10*3/mm3  --   --  370    < > = values in this interval not displayed.     All questions were answered.  This patient was evaluated and treated under the supervision of Dr Manuel Suárez. Dictated by Denny Spencer PA-C.      Denny Spencer PA-C    Date: 11/4/2021  Time: 12:32 EDT

## 2021-11-04 NOTE — PLAN OF CARE
Goal Outcome Evaluation:  Plan of Care Reviewed With: patient, spouse        Progress: improving  Outcome Summary: Pt tolerated treatment well. Pt performed supine exercises with written HEP given and copy placed in chart.  Pt also was able to perform gait with cga with rw x 40'. Pt requires multiple vc's for proper gait techniques.  See flowsheet for details

## 2021-11-04 NOTE — PLAN OF CARE
Goal Outcome Evaluation:  Plan of Care Reviewed With: (P) patient, spouse        Progress: (P) no change  Outcome Summary: (P) Pt willing to participate in OT eval this date. Pt reports Mod I with ADLs needing increased time and modified techniques for LBD and LB bathing. Pt completed HEP in supine in bed in preperation for ambulation with pain at 6/10. Pt moved from supine to sit with CGA. Pt sat EOB x10 mins with supervision for balance. Pt attempted to don socks but required max A due to pain and decreased flexibility. Pt used FWW for sit to stand with CGA. Pt ambulated 40' around the room to bc with FWW and CGA. Pt was noted to be exhausted and pale after ambulating. Pt sat in bc and was independent for scooting to reposition. Pt will benefit from skilled OT services to address functional mobility and indepenence with ADLs until dc.

## 2021-11-04 NOTE — PROGRESS NOTES
[facility]    Name:  Darci Vargas   Age:  56 y.o.  Sex:  male  :  1965  MRN:  [id number]   Visit Number:  [id number]  Admission Date:  11/3/2021  Date Of Service:  21  Primary Care Physician:  Margarita Parra MD     LOS: 0 days :    Chief Complaint:      Left knee joint pain.    Subjective:    Mr. Vargas was seen and examined this morning. He did work with physical therapy and was in fact able to walk several feet with a walker. He still has pain in his left knee. Denies any chest pain or shortness of breath. No significant overnight events. His Gruber catheter was discontinued this morning.    Review of Systems:     All systems were reviewed and negative except as mentioned in subjective, assessment and plan.    Vital Signs:    Temp:  [97.5 °F (36.4 °C)-98 °F (36.7 °C)] 98 °F (36.7 °C)  Heart Rate:  [62-81] 68  Resp:  [16-18] 18  BP: (118-136)/(75-89) 136/89    Intake and output:    I/O last 3 completed shifts:  In: 3020 [P.O.:120; I.V.:2900]  Out: 4050 [Urine:3950; Blood:100]  I/O this shift:  In: 600 [P.O.:600]  Out: 500 [Urine:500]    Physical Examination:    General Appearance:  Alert and cooperative.    Head:  Atraumatic and normocephalic.   Eyes: Conjunctivae and sclerae normal, no icterus. No pallor.   Throat: No oral lesions, no thrush, oral mucosa moist.   Neck: Supple, trachea midline, no thyromegaly.   Lungs:   Breath sounds heard bilaterally equally.  No crackles or wheezing. No Pleural rub or bronchial breathing.   Heart:  Normal S1 and S2, no murmur, no gallop, no rub. No JVD.   Abdomen:   Normal bowel sounds, no masses, no organomegaly. Soft, nontender, nondistended, no rebound tenderness.   Extremities: Supple, no edema, no cyanosis, no clubbing. Surgical bandage noted on the left knee without any surrounding erythema or hemorrhagic staining.   Skin: No bleeding or rash.   Neurologic: Alert and oriented x 3. No facial asymmetry. Moves  all four limbs. No tremors.      Laboratory results:    Results from last 7 days   Lab Units 11/04/21  0545 11/01/21  1703   SODIUM mmol/L 134* 139   POTASSIUM mmol/L 3.8 4.2   CHLORIDE mmol/L 102 101   CO2 mmol/L 28.2 30.1*   BUN mg/dL 7 7   CREATININE mg/dL 0.63* 0.82   CALCIUM mg/dL 8.4* 9.3   GLUCOSE mg/dL 117* 95     Results from last 7 days   Lab Units 11/04/21  0545 11/01/21  1703   WBC 10*3/mm3  --  10.08   HEMOGLOBIN g/dL 15.0 17.2   HEMATOCRIT % 44.8 52.7*   PLATELETS 10*3/mm3  --  370     I have reviewed the patient's laboratory results.    Radiology results:    XR Knee 1 or 2 View Left    Result Date: 11/3/2021  PROCEDURE: XR KNEE 1 OR 2 VW LEFT-  HISTORY: Post-Op Knee Arthoplasty; M17.0-Bilateral primary osteoarthritis of knee  FINDINGS:  Two views left knee show surgical changes of arthroplasty. Hardware has an unremarkable appearance. No fracture is present.  CONCLUSION: Surgical changes of left knee arthroplasty.  This report was finalized on 11/3/2021 11:33 AM by Denny Mason MD.    Peripheral Block    Result Date: 11/3/2021  Yvrose Olvera CRNA     11/3/2021 11:20 AM Peripheral Block Pre-sedation assessment completed: 11/3/2021 10:56 AM Patient reassessed immediately prior to procedure Patient location during procedure: post-op Start time: 11/3/2021 11:01 AM Stop time: 11/3/2021 11:09 AM Reason for block: at surgeon's request and post-op pain management Performed by RACH: Yvrose Olvera CRNA Preanesthetic Checklist Completed: patient identified, IV checked, site marked, risks and benefits discussed, surgical consent, monitors and equipment checked, pre-op evaluation and timeout performed Prep: Pt Position: supine Sterile barriers:cap, gloves, mask and sterile barriers Prep: ChloraPrep Patient monitoring: blood pressure monitoring, continuous pulse oximetry and EKG Procedure Sedation: no Performed under: spinal Guidance:ultrasound guided Images:still images obtained, printed/placed  on chart Laterality:left Block Type:adductor canal block Injection Technique:catheter Needle Type:Tuohy and echogenic Needle Gauge:18 G Resistance on Injection: none Catheter Size:20 G (20g) Medications Used: lidocaine PF (XYLOCAINE) injection 0.5%, 10 mL Med administered at 11/3/2021 11:09 AM Post Assessment Injection Assessment: negative aspiration for heme, incremental injection and no paresthesia on injection Patient Tolerance:comfortable throughout block Complications:no Additional Notes Procedure:         The pt was placed in the Supine position.  The Insertion site was  prepped and Draped in sterile fashion.  The pt was anesthetized with  IV Sedation( see meds).  Skin and cutaneous tissue was infiltrated and anesthetized with 1% Lidocaine 3 mls via a 25g needle.  A  4 inch 18g echogenic needle was then  inserted approximately midline, mid-thigh and advanced In-plane with Ultrasound guidance.  Normal Saline PSF was utilized for hydrodissection of tissue.  The Vastus medialis and Sartorius muscle where visualized and the needle tip was placed in the adductor canal,  lateral to the femoral artery.  LA injection spread was visualized, injection was incremental 1-5ml, injection pressure was normal or little, no intraneural injection, no vascular injection.  LA dose was injected thru the needle(see dose above).  A  20g wire stylet catheter was placed via the needle with ultrasound visualization and confirmation with NS fluid bolus. The catheter insertion site was sealed with exofin tissue adhesive. The labeled catheter was then coiled and secured to skin with benzoin,  steristrips and CHG transparent dressing.  Appropriate labels were applied.  Thank you.     I have reviewed the patient's radiology reports.    Medication Review:     I have reviewed the patient's active and prn medications.     Problem List:      Osteoarthritis of both knees    Essential hypertension    Benign prostatic  hyperplasia      Assessment:    1. Essential hypertension.  2. Status post left knee arthroplasty (POD 1)  3. 3. Benign prostatic hyperplasia.    Plan:    Mr. Vargas is currently hemodynamically stable and his blood work is essentially unremarkable except for a sodium of 134. This is likely secondary to surgery and IV fluids. Patient is currently undergoing physical and occupational therapy. He is being followed by orthopedic services. Hopefully he may be able to go home tomorrow with home health services. He is medically stable from my standpoint. I will sign off at this time. Please do not hesitate to call me with any questions.    DVT Prophylaxis: Lovenox  Code Status: Full  Diet: Cardiac  Discharge Plan: Home with home health tomorrow.    Vladimir Lozano MD  11/04/21  17:57 EDT    Dictated utilizing Dragon dictation.

## 2021-11-04 NOTE — CASE MANAGEMENT/SOCIAL WORK
Discharge Planning Assessment   Christie     Patient Name: Darci Vargas  MRN: [id number]  Today's Date: 11/4/2021    Admit Date: 11/3/2021     Discharge Needs Assessment     Row Name 11/04/21 1001       Living Environment    Lives With spouse    Current Living Arrangements home/apartment/condo    Primary Care Provided by self    Provides Primary Care For no one    Family Caregiver if Needed spouse    Quality of Family Relationships involved    Able to Return to Prior Arrangements yes       Resource/Environmental Concerns    Resource/Environmental Concerns none       Transition Planning    Patient/Family Anticipates Transition to home with help/services    Patient/Family Anticipated Services at Transition     Transportation Anticipated family or friend will provide; car, drives self       Discharge Needs Assessment    Readmission Within the Last 30 Days no previous admission in last 30 days    Equipment Currently Used at Home walker, rolling; cane, straight    Concerns to be Addressed discharge planning    Provided Post Acute Provider List? N/A    Provided Post Acute Provider Quality & Resource List? N/A               Discharge Plan     Row Name 11/04/21 1003       Plan    Plan Spoke to pt regarding discharge plans .Confirmed address and phone number .He is independent with ADLS .He has a Walker and a ramp to the house .From list provided choose [facility] Called Katie she will call back with determination does not  Have a preference for DME if needed   Katie reports [facility] care can accept pt when discharge                 Continued Care and Services - Admitted Since 11/3/2021    Coordination has not been started for this encounter.          Demographic Summary     Row Name 11/04/21 0958       General Information    Admission Type observation    Required Notices Provided Observation Status Notice    Referral Source admission list               Functional Status     Row Name 11/04/21  0959       Functional Status    Usual Activity Tolerance moderate       Functional Status, IADL    Medications independent    Meal Preparation assistive person    Housekeeping assistive person    Laundry assistive person    Shopping assistive person       Mental Status    General Appearance WDL WDL               Psychosocial    No documentation.                Abuse/Neglect    No documentation.                Legal     Row Name 11/04/21 1000       Financial/Legal    Finance Comments denies any needs               Substance Abuse    No documentation.                Patient Forms    No documentation.                   Codi Painter RN

## 2021-11-04 NOTE — PLAN OF CARE
Goal Outcome Evaluation:  Plan of Care Reviewed With: patient        Progress: no change  Outcome Summary: VSS att, Pain controlled with PRN and On-Q pump. Dressing intact with drainage marked. Possible d/c home today.

## 2021-11-04 NOTE — PROGRESS NOTES
Patient: Darci Vargas  Procedure(s):  TOTAL KNEE ARTHROPLASTY  Anesthesia type: spinal, regional    Patient location: [facility] Floor  Last vitals:   Vitals:    11/04/21 1100   BP: 118/81   Pulse: 76   Resp: 16   Temp: 97.7 °F (36.5 °C)   SpO2: 94%     Level of consciousness: awake, alert and oriented    Post-anesthesia pain: adequate analgesia  Airway patency: patent  Respiratory: unassisted  Cardiovascular: stable and blood pressure at baseline  Hydration: euvolemic    Anesthetic complications: no

## 2021-11-05 ENCOUNTER — HOME HEALTH ADMISSION (OUTPATIENT)
Dept: HOME HEALTH SERVICES | Facility: HOME HEALTHCARE | Age: 56
End: 2021-11-05

## 2021-11-05 VITALS
WEIGHT: 228.62 LBS | TEMPERATURE: 97.9 F | HEIGHT: 68 IN | OXYGEN SATURATION: 88 % | BODY MASS INDEX: 34.65 KG/M2 | DIASTOLIC BLOOD PRESSURE: 81 MMHG | RESPIRATION RATE: 18 BRPM | HEART RATE: 92 BPM | SYSTOLIC BLOOD PRESSURE: 129 MMHG

## 2021-11-05 PROCEDURE — 25010000002 ENOXAPARIN PER 10 MG: Performed by: ORTHOPAEDIC SURGERY

## 2021-11-05 PROCEDURE — 94799 UNLISTED PULMONARY SVC/PX: CPT

## 2021-11-05 PROCEDURE — 97116 GAIT TRAINING THERAPY: CPT

## 2021-11-05 PROCEDURE — G0378 HOSPITAL OBSERVATION PER HR: HCPCS

## 2021-11-05 PROCEDURE — 97110 THERAPEUTIC EXERCISES: CPT

## 2021-11-05 RX ADMIN — OXYCODONE HYDROCHLORIDE AND ACETAMINOPHEN 2 TABLET: 5; 325 TABLET ORAL at 09:22

## 2021-11-05 RX ADMIN — AMLODIPINE BESYLATE 10 MG: 5 TABLET ORAL at 09:22

## 2021-11-05 RX ADMIN — ENOXAPARIN SODIUM 30 MG: 30 INJECTION SUBCUTANEOUS at 09:21

## 2021-11-05 RX ADMIN — OXYCODONE HYDROCHLORIDE AND ACETAMINOPHEN 2 TABLET: 5; 325 TABLET ORAL at 02:55

## 2021-11-05 RX ADMIN — DOCUSATE SODIUM 50MG AND SENNOSIDES 8.6MG 2 TABLET: 8.6; 5 TABLET, FILM COATED ORAL at 09:22

## 2021-11-05 RX ADMIN — TIOTROPIUM BROMIDE INHALATION SPRAY 2 PUFF: 3.12 SPRAY, METERED RESPIRATORY (INHALATION) at 07:26

## 2021-11-05 RX ADMIN — KETOTIFEN FUMARATE 1 DROP: 0.35 SOLUTION/ DROPS OPHTHALMIC at 09:22

## 2021-11-05 RX ADMIN — LOSARTAN POTASSIUM 100 MG: 50 TABLET, FILM COATED ORAL at 09:22

## 2021-11-05 RX ADMIN — TAMSULOSIN HYDROCHLORIDE 0.4 MG: 0.4 CAPSULE ORAL at 09:22

## 2021-11-05 RX ADMIN — DULOXETINE HYDROCHLORIDE 60 MG: 30 CAPSULE, DELAYED RELEASE ORAL at 09:22

## 2021-11-05 NOTE — DISCHARGE PLACEMENT REQUEST
"Kalyan Allred (56 y.o. Male)             Date of Birth Social Security Number Address Home Phone MRN    1965  [address]  [facility] [address] [phone] [id number]    [employer] Marital Status             Confucianism        Admission Date Admission Type Admitting Provider Attending Provider Department, Room/Bed    11/3/21 Elective Manuel Suárez MD Grau, Gregory F, MD [facility] SUR[facility]  3, /    Discharge Date Discharge Disposition Discharge Destination           Home or Self Care              Attending Provider: Manuel Suárez MD    Allergies: No Known Allergies    Isolation: None   Infection: None   Code Status: CPR   Advance Care Planning Activity    Ht: 172.7 cm (68\")   Wt: 104 kg (228 lb 9.9 oz)    Admission Cmt: None   Principal Problem: None                Active Insurance as of 11/3/2021     Primary Coverage     Payor Plan Insurance Group Employer/Plan Group    ANTHEM MEDICARE REPLACEMENT ANTHEM MEDICARE ADVANTAGE [id number]     Payor Plan Address Payor Plan Phone Number Payor Plan Fax Number Effective Dates    [address] [phone]  2020 - None Entered    [facility] [address]       Subscriber Name Subscriber Birth Date Member ID       KALYAN ALLRED 1965 [id number]                 Emergency Contacts      (Rel.) Home Phone Work Phone Mobile Phone    Lacho Allred (Spouse) [phone] -- [phone]    Quinn Allred (Father) [phone] -- --        [facility] SURG  3  [address] [facility] [address]  Phone:  [phone]  Fax:  [phone] Date: 2021      Ambulatory Referral to Home Health     Patient:  Kalyan Allred MRN:  [id number]   [address]  [facility] [address] :  1965  SSN:    Phone: [phone] Sex:  M      INSURANCE PAYOR PLAN GROUP # SUBSCRIBER ID   Primary:    ANTHEM MEDICARE REPLACEMENT [id number]      Referring Provider Information:  FLORA" SYDNI Phone: [phone] Fax: [phone]      Referral Information:   # Visits:  999 Referral Type: Home Health [42]   Urgency:  Routine Referral Reason: Specialty Services Required   Start Date: Nov 5, 2021 End Date:  To be determined by Insurer   Diagnosis: Osteoarthritis of both knees (M17.0 [ICD-10-CM] 715.96 [ICD-9-CM])  Essential hypertension (I10 [ICD-10-CM] 401.9 [ICD-9-CM])      Refer to Dept:  [facility]  Refer to Provider:   Refer to Facility:       Face to Face Visit Date: 11/5/2021  Follow-up provider for Plan of Care? I treated the patient in an acute care facility and will not continue treatment after discharge.  Follow-up provider: JEFFERSON GABRIEL [[id number]]  Reason/Clinical Findings: Left knee joint replacement, HTN, BPH  Describe mobility limitations that make leaving home difficult: Left TKA  Nursing/Therapeutic Services Requested: Physical Therapy  Nursing/Therapeutic Services Requested: Occupational Therapy  PT orders: Total joint pathway  PT orders: Therapeutic exercise  PT orders: Gait Training  PT orders: Transfer training  PT orders: Strengthening  Weight Bearing Status: As Tolerated  Occupational orders: Activities of daily living  Occupational orders: Strengthening  Frequency: 1 Week 1     This document serves as a request of services and does not constitute Insurance authorization or approval of services.  To determine eligibility, please contact the members Insurance carrier to verify and review coverage.     If you have medical questions regarding this request for services. Please contact [facility]  3 at [phone] during normal business hours.         Authorizing Provider:Sydni Lozano MD  Authorizing Provider's NPI: [id number]  Order Entered By: Sydni Lozano MD 11/5/2021 10:08 AM     Electronically signed by: Sydni Lozano MD 11/5/2021 10:08 AM               Discharge Summary      Denny Spencer PA-C at 11/05/21 9365            Date of  Discharge:  11/5/2021    Discharge Diagnosis: Status post left total knee arthroplasty    Presenting Problem/History of Present Illness  Active Hospital Problems    Diagnosis  POA   • Osteoarthritis of both knees [M17.0]  Yes   • Essential hypertension [I10]  Yes   • Benign prostatic hyperplasia [N40.0]  Yes      Resolved Hospital Problems   No resolved problems to display.        Hospital Course  Patient is a 56 y.o. male presented For left total knee arthroplasty secondary to advanced osteoarthritis.  Surgery was performed on 11/3/21.  Pain is been well controlled.  He has progressed well with physical therapy as an inpatient.  He has had no postoperative complications.  He has been cleared for discharge and home health arrangements have been made.  He notes no chest pain, abdominal pain, dyspnea.      Procedures Performed    Procedure(s):  TOTAL KNEE ARTHROPLASTY  -------------------       Consults:   Consults     Date and Time Order Name Status Description    11/3/2021  1:01 PM Inpatient Hospitalist Consult Completed           Condition on Discharge:  Stable.    Vital Signs  Temp:  [96.9 °F (36.1 °C)-98.2 °F (36.8 °C)] 97.6 °F (36.4 °C)  Heart Rate:  [68-94] 84  Resp:  [18-19] 18  BP: (136-146)/(89-96) 137/96    Physical Exam:  General: Patient is alert and oriented ×3.  No acute distress.  Left lower extremity: Bandage intact of the left anterior knee with blood stains.  Mild swelling of the left thigh and calf.  This is improving.  No signs of DVT of the calf or thigh.  Negative Homans.  Neurovascular status intact left lower extremity.  Intact range of motion of left foot and ankle.  Intact capillary refill.    Discharge Disposition  Home or Self Care    Discharge Medications     Discharge Medications      New Medications      Instructions Start Date   enoxaparin 30 MG/0.3ML solution syringe  Commonly known as: LOVENOX   30 mg, Subcutaneous, Daily   Start Date: November 6, 2021        Continue These  Medications      Instructions Start Date   albuterol sulfate  (90 Base) MCG/ACT inhaler  Commonly known as: PROVENTIL HFA;VENTOLIN HFA;PROAIR HFA   2 puffs, Inhalation, Every 6 Hours PRN      amLODIPine 10 MG tablet  Commonly known as: NORVASC   10 mg, Oral, Daily      Breo Ellipta 200-25 MCG/INH inhaler  Generic drug: Fluticasone Furoate-Vilanterol   1 puff, Inhalation, Daily - RT      DULoxetine 60 MG capsule  Commonly known as: Cymbalta   60 mg, Oral, Daily      fluticasone 50 MCG/ACT nasal spray  Commonly known as: FLONASE   SPRAY TWO (2) SPRAYS INTO EACH NOSTRIL EVERY DAY AS DIRECTED        losartan 100 MG tablet  Commonly known as: COZAAR   100 mg, Oral, Daily      Spiriva Respimat 2.5 MCG/ACT aerosol solution inhaler  Generic drug: tiotropium bromide monohydrate   2 puffs, Inhalation, Daily      tamsulosin 0.4 MG capsule 24 hr capsule  Commonly known as: FLOMAX   1 capsule, Oral, Daily         Percocet 5/325 1-2 by mouth every 6 hours when necessary for pain prescription will be sent to the hospital pharmacy to be picked up prior to discharge.    Discharge Diet: Regular as tolerated.    Activity at Discharge: Weightbearing as tolerated.  Recommend walker assistance And fall precautions.  Recommend home needs assessment and home equipment of standard walker and bedside commode.    Follow-up Appointments  Future Appointments   Date Time Provider Department Center   2/24/2022  1:45 PM Jade Contreras MD [facility]     Additional Instructions for the Follow-ups that You Need to Schedule     Ambulatory Referral to Home Health   As directed      Face to Face Visit Date: 11/5/2021    Follow-up provider for Plan of Care?: I treated the patient in an acute care facility and will not continue treatment after discharge.    Follow-up provider: JEFFERSON GABRIEL [[id number]]    Reason/Clinical Findings: Left knee joint replacement, HTN, BPH    Describe mobility limitations that make leaving home difficult:  Left TKA    Nursing/Therapeutic Services Requested: Physical Therapy Occupational Therapy    PT orders: Total joint pathway Therapeutic exercise Gait Training Transfer training Strengthening    Weight Bearing Status: As Tolerated    Occupational orders: Activities of daily living Strengthening    Frequency: 1 Week 1               Call for any questions or concerns, [facility] at .    Denny Spencer PA-C  11/05/21  12:46 EDT              Electronically signed by Denny Spencer PA-C at 11/05/21 2421

## 2021-11-05 NOTE — PLAN OF CARE
Goal Outcome Evaluation:  Plan of Care Reviewed With: patient        Progress: improving  Outcome Summary: Pt tolerated treatment well. Pt performed bed mobility with sba and vc's.  Pt performed supine TE x 15 reps. Pt also was able to ambulate 63' cga with rw and vc's. Pt was instructed per proper techniques for self assisted exercises as well.   Pt with improved movement of L LE during swing phase portion of gait.  See flowsheet for details

## 2021-11-05 NOTE — CASE MANAGEMENT/SOCIAL WORK
Case Management Discharge Note           Provided Post Acute Provider List?: N/A  Provided Post Acute Provider Quality & Resource List?: N/A    Selected Continued Care - Admitted Since 11/3/2021     Destination    No services have been selected for the patient.              Durable Medical Equipment    No services have been selected for the patient.              Dialysis/Infusion    No services have been selected for the patient.              Home Medical Care Coordination complete.    Service Provider Selected Services Address Phone Fax Patient Preferred    [facility]  [facility] [address] [address] [phone] --          Therapy    No services have been selected for the patient.              Community Resources    No services have been selected for the patient.              Community & DME    No services have been selected for the patient.                       Final Discharge Disposition Code: 06 - home with home health care

## 2021-11-05 NOTE — DISCHARGE SUMMARY
Date of Discharge:  11/5/2021    Discharge Diagnosis: Status post left total knee arthroplasty    Presenting Problem/History of Present Illness  Active Hospital Problems    Diagnosis  POA   • Osteoarthritis of both knees [M17.0]  Yes   • Essential hypertension [I10]  Yes   • Benign prostatic hyperplasia [N40.0]  Yes      Resolved Hospital Problems   No resolved problems to display.        Hospital Course  Patient is a 56 y.o. male presented For left total knee arthroplasty secondary to advanced osteoarthritis.  Surgery was performed on 11/3/21.  Pain is been well controlled.  He has progressed well with physical therapy as an inpatient.  He has had no postoperative complications.  He has been cleared for discharge and home health arrangements have been made.  He notes no chest pain, abdominal pain, dyspnea.      Procedures Performed    Procedure(s):  TOTAL KNEE ARTHROPLASTY  -------------------       Consults:   Consults     Date and Time Order Name Status Description    11/3/2021  1:01 PM Inpatient Hospitalist Consult Completed           Condition on Discharge:  Stable.    Vital Signs  Temp:  [96.9 °F (36.1 °C)-98.2 °F (36.8 °C)] 97.6 °F (36.4 °C)  Heart Rate:  [68-94] 84  Resp:  [18-19] 18  BP: (136-146)/(89-96) 137/96    Physical Exam:  General: Patient is alert and oriented ×3.  No acute distress.  Left lower extremity: Bandage intact of the left anterior knee with blood stains.  Mild swelling of the left thigh and calf.  This is improving.  No signs of DVT of the calf or thigh.  Negative Homans.  Neurovascular status intact left lower extremity.  Intact range of motion of left foot and ankle.  Intact capillary refill.    Discharge Disposition  Home or Self Care    Discharge Medications     Discharge Medications      New Medications      Instructions Start Date   enoxaparin 30 MG/0.3ML solution syringe  Commonly known as: LOVENOX   30 mg, Subcutaneous, Daily   Start Date: November 6, 2021        Continue These  Medications      Instructions Start Date   albuterol sulfate  (90 Base) MCG/ACT inhaler  Commonly known as: PROVENTIL HFA;VENTOLIN HFA;PROAIR HFA   2 puffs, Inhalation, Every 6 Hours PRN      amLODIPine 10 MG tablet  Commonly known as: NORVASC   10 mg, Oral, Daily      Breo Ellipta 200-25 MCG/INH inhaler  Generic drug: Fluticasone Furoate-Vilanterol   1 puff, Inhalation, Daily - RT      DULoxetine 60 MG capsule  Commonly known as: Cymbalta   60 mg, Oral, Daily      fluticasone 50 MCG/ACT nasal spray  Commonly known as: FLONASE   SPRAY TWO (2) SPRAYS INTO EACH NOSTRIL EVERY DAY AS DIRECTED        losartan 100 MG tablet  Commonly known as: COZAAR   100 mg, Oral, Daily      Spiriva Respimat 2.5 MCG/ACT aerosol solution inhaler  Generic drug: tiotropium bromide monohydrate   2 puffs, Inhalation, Daily      tamsulosin 0.4 MG capsule 24 hr capsule  Commonly known as: FLOMAX   1 capsule, Oral, Daily         Percocet 5/325 1-2 by mouth every 6 hours when necessary for pain prescription will be sent to the hospital pharmacy to be picked up prior to discharge.    Discharge Diet: Regular as tolerated.    Activity at Discharge: Weightbearing as tolerated.  Recommend walker assistance And fall precautions.  Recommend home needs assessment and home equipment of standard walker and bedside commode.    Follow-up Appointments  Future Appointments   Date Time Provider Department Center   2/24/2022  1:45 PM Jade Contreras MD [facility]     Additional Instructions for the Follow-ups that You Need to Schedule     Ambulatory Referral to Home Health   As directed      Face to Face Visit Date: 11/5/2021    Follow-up provider for Plan of Care?: I treated the patient in an acute care facility and will not continue treatment after discharge.    Follow-up provider: JEFFERSON GABRIEL [[id number]]    Reason/Clinical Findings: Left knee joint replacement, HTN, BPH    Describe mobility limitations that make leaving home difficult:  Left TKA    Nursing/Therapeutic Services Requested: Physical Therapy Occupational Therapy    PT orders: Total joint pathway Therapeutic exercise Gait Training Transfer training Strengthening    Weight Bearing Status: As Tolerated    Occupational orders: Activities of daily living Strengthening    Frequency: 1 Week 1               Call for any questions or concerns, [facility] at .    Denny Spencer PA-C  11/05/21  12:46 EDT

## 2021-11-05 NOTE — CASE MANAGEMENT/SOCIAL WORK
Discharge Planning Assessment  [facility]     Patient Name: Darci Vargas  MRN: [id number]  Today's Date: 11/5/2021    Admit Date: 11/3/2021     Discharge Needs Assessment    No documentation.                Discharge Plan     Row Name 11/05/21 1415       Plan    Plan Comments [facility] accepted pt    Row Name 11/05/21 1323       Plan    Plan Comments [facility] nofified of dischargel  Left [facility] intake message.  Order e faxed    Final Discharge Disposition Code 06 - home with home health care              Continued Care and Services - Admitted Since 11/3/2021     Home Medical Care Coordination complete.    Service Provider Request Status Selected Services Address Phone Fax Patient Preferred     [facility]   Selected Home Health Services [address] [address] [phone] --              Expected Discharge Date and Time     Expected Discharge Date Expected Discharge Time    Nov 5, 2021          Demographic Summary    No documentation.                Functional Status    No documentation.                Psychosocial    No documentation.                Abuse/Neglect    No documentation.                Legal    No documentation.                Substance Abuse    No documentation.                Patient Forms    No documentation.                   Codi Diaz RN

## 2021-11-05 NOTE — PROGRESS NOTES
Pratik    Acute pain service Inpatient Progress Note    Patient Name: Darci Vargas  :  1965  MRN:  [id number]        Acute Pain  Service Inpatient Progress Note:    Analgesia:Good  Pain Score:3/10  LOC: alert and awake  Resp Status: room air and spontaneous ventilation  Cardiac: VS stable and no Change in preop status  Side Effects:None  Catheter Site:clean, dry and dressing intact  Cath type: peripheral nerve cath with ON Q  Infusion rate: 12ml/hr  Anesthesia block local: Bupivicaine 0.0125%  Catheter Plan:Catheter to remain Insitu and Continue catheter infusion rate unchanged

## 2021-11-05 NOTE — PLAN OF CARE
Goal Outcome Evaluation:  Plan of Care Reviewed With: patient        Progress: no change  Outcome Summary: VSS att. Pt c/o pain that was controlled with PRN pain meds, ice, and his On-Q pump. Pt is possible d/c home today.

## 2021-11-05 NOTE — THERAPY TREATMENT NOTE
Patient Name: Darci Vargas  : 1965    MRN: [id number]                              Today's Date: 2021       Admit Date: 11/3/2021    Visit Dx:     ICD-10-CM ICD-9-CM   1. Essential hypertension  I10 401.9   2. Osteoarthritis of both knees  M17.0 715.96     Patient Active Problem List   Diagnosis   • Osteoarthritis of both knees   • Essential hypertension   • Benign prostatic hyperplasia     Past Medical History:   Diagnosis Date   • BPH (benign prostatic hyperplasia)    • COPD (chronic obstructive pulmonary disease) (HCC)    • COVID-19 2020   • COVID-19 vaccine series completed     moderna   • Gallstones    • High cholesterol    • Hypertension    • Impaired functional mobility, balance, gait, and endurance    • Osteoarthrosis    • Pancreatitis    • Pneumonia    • RA (rheumatoid arthritis) ([facility])    • Sleep apnea     Patient does not use CPAP   • Tobacco abuse    • Wears glasses      Past Surgical History:   Procedure Laterality Date   • CHOLECYSTECTOMY     • COLONOSCOPY     • TOTAL KNEE ARTHROPLASTY Left 11/3/2021    Procedure: TOTAL KNEE ARTHROPLASTY;  Surgeon: Manuel Suárez MD;  Location: [facility];  Service: Orthopedics;  Laterality: Left;      General Information     Row Name 21 1243          Physical Therapy Time and Intention    Document Type therapy note (daily note)  -RM     Mode of Treatment physical therapy  -RM     Row Name 21 1243          General Information    Patient Profile Reviewed yes  -RM     Existing Precautions/Restrictions fall  -RM     Row Name 21 1243          Cognition    Orientation Status (Cognition) oriented x 4  -RM     Row Name 21 1243          Safety Issues, Functional Mobility    Impairments Affecting Function (Mobility) range of motion (ROM); strength; pain; endurance/activity tolerance  -RM           User Key  (r) = Recorded By, (t) = Taken By, (c) = Cosigned By    Initials Name Provider Type    RM Ronald Stephen, PTA Physical Therapy  Assistant               Mobility     Row Name 11/05/21 1246          Bed Mobility    Supine-Sit [place] (Bed Mobility) contact guard; standby assist; verbal cues; nonverbal cues (demo/gesture)  -RM     Assistive Device (Bed Mobility) head of bed elevated; bed rails  -RM     Row Name 11/05/21 1246          Sit-Stand Transfer    Sit-Stand [place] (Transfers) contact guard; verbal cues  -RM     Assistive Device (Sit-Stand Transfers) walker, front-wheeled  -RM     Row Name 11/05/21 1246          Gait/Stairs (Locomotion)    [place] Level (Gait) contact guard; verbal cues  -RM     Assistive Device (Gait) walker, front-wheeled  -RM     Distance in Feet (Gait) 63'  -RM     Deviations/Abnormal Patterns (Gait) antalgic; base of support, wide; gait speed decreased; stride length decreased  -RM     Bilateral Gait Deviations forward flexed posture  -RM     Left Sided Gait Deviations heel strike decreased; weight shift ability decreased  -RM           User Key  (r) = Recorded By, (t) = Taken By, (c) = Cosigned By    Initials Name Provider Type    RM Ronald Stephen, PTA Physical Therapy Assistant               Obj/Interventions     Row Name 11/05/21 1248          Hip (Therapeutic Exercise)    Hip Isometrics (Therapeutic Exercise) 10 repetitions; 5 repetitions; bilateral; supine  -RM     Hip Strengthening (Therapeutic Exercise) heel slides; left; 10 repetitions; 5 repetitions; supine; aBduction; aDduction  -     Row Name 11/05/21 1248          Knee (Therapeutic Exercise)    Knee Isometrics (Therapeutic Exercise) quad sets; left; 10 repetitions; 5 repetitions; 3 second hold; supine  -RM     Knee Strengthening (Therapeutic Exercise) SLR (straight leg raise); SAQ (short arc quad); left; supine; 10 repetitions; 5 repetitions  -RM     [facility] Name 11/05/21 1248          Ankle (Therapeutic Exercise)    Ankle AROM (Therapeutic Exercise) bilateral; dorsiflexion; plantarflexion; 10 repetitions; 5 repetitions  -RM            User Key  (r) = Recorded By, (t) = Taken By, (c) = Cosigned By    Initials Name Provider Type    Ronald Berger, EMA Physical Therapy Assistant               Goals/Plan    No documentation.                Clinical Impression     Row Name 11/05/21 1249          Pain Scale: Numbers Pre/Post-Treatment    Pretreatment Pain Rating 2/10  -RM     Posttreatment Pain Rating 2/10  -RM     Pain Location - Side Left  -RM     Pain Location knee  -RM     Row Name 11/05/21 1249          Plan of Care Review    Plan of Care Reviewed With patient  -RM     Progress improving  -RM     Outcome Summary Pt tolerated treatment well. Pt performed bed mobility with sba and vc's.  Pt performed supine TE x 15 reps. Pt also was able to ambulate 63' cga with rw and vc's. Pt was instructed per proper techniques for self assisted exercises as well.   Pt with improved movement of L LE during swing phase portion of gait.  See flowsheet for details  -RM     Row Name 11/05/21 1249          Vital Signs    Pre Patient Position Supine  -RM     Intra Patient Position Standing  -RM     Post Patient Position Sitting  -RM     Row Name 11/05/21 1249          Positioning and Restraints    Pre-Treatment Position in bed  -RM     Post Treatment Position chair  -RM     In Chair reclined; call light within reach; encouraged to call for assist; with family/caregiver; notified nsg  -RM           User Key  (r) = Recorded By, (t) = Taken By, (c) = Cosigned By    Initials Name Provider Type    Ronald Berger, EMA Physical Therapy Assistant               Outcome Measures     Row Name 11/05/21 1256          How much help from another person do you currently need...    Turning from your back to your side while in flat bed without using bedrails? 4  -RM     Moving from lying on back to sitting on the side of a flat bed without bedrails? 4  -RM     Moving to and from a bed to a chair (including a wheelchair)? 3  -RM     Standing up from a chair using your arms  (e.g., wheelchair, bedside chair)? 3  -RM     Climbing 3-5 steps with a railing? 2  -RM     To walk in hospital room? 3  -RM     AM-PAC 6 Clicks Score (PT) 19  -RM     Row Name 11/05/21 1252          Functional Assessment    Outcome Measure Options AM-PAC 6 Clicks Basic Mobility (PT)  -           User Key  (r) = Recorded By, (t) = Taken By, (c) = Cosigned By    Initials Name Provider Type     Ronald Stephen, EMA Physical Therapy Assistant                             Physical Therapy Education                 Title: PT OT SLP Therapies (In Progress)     Topic: Physical Therapy (In Progress)     Point: Mobility training (Done)     Learning Progress Summary           Patient Acceptance, E,TB,D, VU,Bed IU by  at 11/5/2021 1253    Comment: transfers and HEP.    Acceptance, E,TB,D,H, VU,DU by  at 11/4/2021 1320    Acceptance, E,TB, VU by  at 11/3/2021 1821    Comment: Role of PT and POC                   Point: Home exercise program (Done)     Learning Progress Summary           Patient Acceptance, E,TB,D, VU,Bed IU by  at 11/5/2021 1253    Comment: transfers and HEP.    Acceptance, E,TB,D,H, VU,DU by  at 11/4/2021 1320                   Point: Body mechanics (Not Started)     Learner Progress:  Not documented in this visit.          Point: Precautions (Not Started)     Learner Progress:  Not documented in this visit.                      User Key     Initials Effective Dates Name Provider Type Discipline     06/16/21 -  Nica Morgan, PT Physical Therapist PT     06/16/21 -  Ronald Stephen, EMA Physical Therapy Assistant PT              PT Recommendation and Plan     Plan of Care Reviewed With: patient  Progress: improving  Outcome Summary: Pt tolerated treatment well. Pt performed bed mobility with sba and vc's.  Pt performed supine TE x 15 reps. Pt also was able to ambulate 63' cga with rw and vc's. Pt was instructed per proper techniques for self assisted exercises as well.   Pt with improved  movement of L LE during swing phase portion of gait.  See flowsheet for details     Time Calculation:    PT Charges     Row Name 11/05/21 1253             Time Calculation    Start Time 0959  -RM      Stop Time 1046  -RM      Time Calculation (min) 47 min  -RM      PT Received On 11/05/21  -RM      PT Goal Re-Cert Due Date 11/13/21  -RM              Time Calculation- PT    Total Timed Code Minutes- PT 47 minute(s)  -RM              Timed Charges    09227 - PT Therapeutic Exercise Minutes 25  -RM      88561 - Gait Training Minutes  22  -RM              Total Minutes    Timed Charges Total Minutes 47  -RM       Total Minutes 47  -RM            User Key  (r) = Recorded By, (t) = Taken By, (c) = Cosigned By    Initials Name Provider Type    Ronald Berger, PTA Physical Therapy Assistant              Therapy Charges for Today     Code Description Service Date Service Provider Modifiers Qty    86216503580 HC PT THER PROC EA 15 MIN 11/4/2021 Ronald Stephen, PTA GP 3    78411504901 HC GAIT TRAINING EA 15 MIN 11/4/2021 Ronald Stephen, PTA GP 1    58485295044 HC PT THER PROC EA 15 MIN 11/5/2021 Ronald Stephen, PTA GP 2    59060820577 HC GAIT TRAINING EA 15 MIN 11/5/2021 Ronald Stephen, PTA GP 1          PT G-Codes  Outcome Measure Options: AM-PAC 6 Clicks Basic Mobility (PT)  AM-PAC 6 Clicks Score (PT): 19  AM-PAC 6 Clicks Score (OT): 20    Ronald Stephen PTA  11/5/2021

## 2021-11-06 ENCOUNTER — HOME CARE VISIT (OUTPATIENT)
Dept: HOME HEALTH SERVICES | Facility: HOME HEALTHCARE | Age: 56
End: 2021-11-06

## 2021-11-06 VITALS
HEART RATE: 85 BPM | OXYGEN SATURATION: 94 % | SYSTOLIC BLOOD PRESSURE: 137 MMHG | RESPIRATION RATE: 16 BRPM | TEMPERATURE: 97.2 F | DIASTOLIC BLOOD PRESSURE: 79 MMHG

## 2021-11-06 LAB
LAB AP CASE REPORT: NORMAL
PATH REPORT.FINAL DX SPEC: NORMAL

## 2021-11-06 PROCEDURE — G0151 HHCP-SERV OF PT,EA 15 MIN: HCPCS

## 2021-11-06 NOTE — HOME HEALTH
Pt is a 56 year old male, s/p L TKA by Dr. Suárez on 11/3. Pt lives at home with wife and was previously independent with ADLs and functional mobility, recently with use of FWW. Pt has ramp to enter back door.     Ortho follow-up: 11/17  PCP: 11/11

## 2021-11-06 NOTE — PROGRESS NOTES
CAITLIN Christie    Nerve Cath Post Op Call    Patient Name: Darci Vargas  :  1965  MRN:  [id number]  Date of Discharge: 2021    Nerve Cath Post Op Call:    Analgesia:Good  Pain Score:2/10  Side Effects:None  Catheter Site:clean  Patient Controlled ON Q pump infusion rate: 12ml/hr  Catheter Plan: Patient/Family member report nerve catheter previously discontinued, tip intact

## 2021-11-10 ENCOUNTER — HOME CARE VISIT (OUTPATIENT)
Dept: HOME HEALTH SERVICES | Facility: HOME HEALTHCARE | Age: 56
End: 2021-11-10

## 2021-11-10 PROCEDURE — G0157 HHC PT ASSISTANT EA 15: HCPCS

## 2021-11-11 ENCOUNTER — HOME CARE VISIT (OUTPATIENT)
Dept: HOME HEALTH SERVICES | Facility: HOME HEALTHCARE | Age: 56
End: 2021-11-11

## 2021-11-11 VITALS
SYSTOLIC BLOOD PRESSURE: 124 MMHG | TEMPERATURE: 98.9 F | RESPIRATION RATE: 16 BRPM | DIASTOLIC BLOOD PRESSURE: 90 MMHG | OXYGEN SATURATION: 95 % | HEART RATE: 100 BPM

## 2021-11-11 VITALS
RESPIRATION RATE: 16 BRPM | HEART RATE: 67 BPM | SYSTOLIC BLOOD PRESSURE: 148 MMHG | DIASTOLIC BLOOD PRESSURE: 105 MMHG | OXYGEN SATURATION: 94 %

## 2021-11-11 PROCEDURE — G0152 HHCP-SERV OF OT,EA 15 MIN: HCPCS

## 2021-11-11 PROCEDURE — G0151 HHCP-SERV OF PT,EA 15 MIN: HCPCS

## 2021-11-11 NOTE — HOME HEALTH
"Pt states increased pain and swelling today. He had to ride in the car earlier for MD العراقي and thinks he \"over-did it with exercises yesterday.\"    Pt with elevated BP this date; attempted to call Dr. Suárez's office x3; left message with BP readings.   Pt declined trip to ED. Pt's sister is to bring a BP cuff so that he can monitor it today and states he will go to the hopsital if it does not return to normal and/or if symptoms arise. Exercise/mobility deferred this date due to hypertension.    Spoke to pt's sife at 7:30 pm and she states that his BP had return to normal and pt continues to deny symptoms."

## 2021-11-12 ENCOUNTER — HOME CARE VISIT (OUTPATIENT)
Dept: HOME HEALTH SERVICES | Facility: HOME HEALTHCARE | Age: 56
End: 2021-11-12

## 2021-11-12 VITALS
SYSTOLIC BLOOD PRESSURE: 157 MMHG | DIASTOLIC BLOOD PRESSURE: 92 MMHG | TEMPERATURE: 98.3 F | RESPIRATION RATE: 16 BRPM | HEART RATE: 92 BPM | OXYGEN SATURATION: 95 %

## 2021-11-12 PROCEDURE — G0157 HHC PT ASSISTANT EA 15: HCPCS

## 2021-11-15 ENCOUNTER — HOME CARE VISIT (OUTPATIENT)
Dept: HOME HEALTH SERVICES | Facility: HOME HEALTHCARE | Age: 56
End: 2021-11-15

## 2021-11-15 VITALS
HEART RATE: 90 BPM | DIASTOLIC BLOOD PRESSURE: 94 MMHG | TEMPERATURE: 98 F | OXYGEN SATURATION: 95 % | RESPIRATION RATE: 16 BRPM | SYSTOLIC BLOOD PRESSURE: 151 MMHG

## 2021-11-15 PROCEDURE — G0157 HHC PT ASSISTANT EA 15: HCPCS

## 2021-11-18 ENCOUNTER — HOME CARE VISIT (OUTPATIENT)
Dept: HOME HEALTH SERVICES | Facility: HOME HEALTHCARE | Age: 56
End: 2021-11-18

## 2021-11-18 VITALS
TEMPERATURE: 97.2 F | HEART RATE: 82 BPM | DIASTOLIC BLOOD PRESSURE: 115 MMHG | SYSTOLIC BLOOD PRESSURE: 161 MMHG | OXYGEN SATURATION: 95 % | RESPIRATION RATE: 16 BRPM

## 2021-11-18 PROCEDURE — G0151 HHCP-SERV OF PT,EA 15 MIN: HCPCS

## 2021-11-19 NOTE — H&P
Patient Care Team:  Margarita Parra MD as PCP - General (Internal Medicine)    Chief complaint left knee Dheeraj    Subjective     Patient is a 56 y.o. male presents with left knee arthritis     review of Systems   Pertinent items are noted in HPI    History  Past Medical History:   Diagnosis Date   • BPH (benign prostatic hyperplasia)    • COPD (chronic obstructive pulmonary disease) ([facility])    • COVID-19 2020   • COVID-19 vaccine series completed     moderna   • Gallstones    • High cholesterol    • Hypertension    • Impaired functional mobility, balance, gait, and endurance    • Osteoarthrosis    • Pancreatitis    • Pneumonia    • RA (rheumatoid arthritis) ([facility])    • Sleep apnea     Patient does not use CPAP   • Tobacco abuse    • Wears glasses      Past Surgical History:   Procedure Laterality Date   • CHOLECYSTECTOMY     • COLONOSCOPY     • TOTAL KNEE ARTHROPLASTY Left 11/3/2021    Procedure: TOTAL KNEE ARTHROPLASTY;  Surgeon: Manuel Suárez MD;  Location: [facility];  Service: Orthopedics;  Laterality: Left;     Family History   Problem Relation Age of Onset   • Asthma Mother    • Hyperlipidemia Mother    • Heart disease Father    • Melanoma Father    • Sleep apnea Father    • Dementia Maternal Grandmother    • ADD / ADHD Neg Hx    • Alcohol abuse Neg Hx    • Anxiety disorder Neg Hx    • Bipolar disorder Neg Hx    • Depression Neg Hx    • Drug abuse Neg Hx    • OCD Neg Hx    • Paranoid behavior Neg Hx    • Schizophrenia Neg Hx    • Seizures Neg Hx    • Self-Injurious Behavior  Neg Hx    • Suicide Attempts Neg Hx      Social History     Tobacco Use   • Smoking status: Former Smoker     Packs/day: 1.00     Years: 45.00     Pack years: 45.00     Types: Cigarettes     Quit date: 2019     Years since quittin.8   • Smokeless tobacco: Never Used   Vaping Use   • Vaping Use: Never used   Substance Use Topics   • Alcohol use: Not Currently   • Drug use: No     No medications prior to admission.      Allergies:  Patient has no known allergies.    Objective     Vital Signs  Temp:  [97.2 °F (36.2 °C)] 97.2 °F (36.2 °C)  Heart Rate:  [82] 82  Resp:  [16] 16  BP: (161)/(115) 161/115    Physical Exam:      General Appearance:    Alert, cooperative, in no acute distress   Head:    Normocephalic, without obvious abnormality, atraumatic   Eyes:            Lids and lashes normal, conjunctivae and sclerae normal, no   icterus, no pallor, corneas clear, PERRLA   Ears:    Ears appear intact with no abnormalities noted   Throat:   No oral lesions, no thrush, oral mucosa moist   Neck:   No adenopathy, supple, trachea midline, no thyromegaly, no   carotid bruit, no JVD   Back:     No kyphosis present, no scoliosis present, no skin lesions,      erythema or scars, no tenderness to percussion or                   palpation,   range of motion normal   Lungs:     Clear to auscultation,respirations regular, even and                  unlabored    Heart:    Regular rhythm and normal rate, normal S1 and S2, no            murmur, no gallop, no rub, no click   Chest Wall:    No abnormalities observed   Abdomen:     Normal bowel sounds, no masses, no organomegaly, soft        non-tender, non-distended, no guarding, no rebound                tenderness   Rectal:     Deferred   Extremities:   Moves all extremities well, no edema, no cyanosis, no             redness range of motion 5-1 20   Pulses:   Pulses palpable and equal bilaterally   Skin:   No bleeding, bruising or rash   Lymph nodes:   No palpable adenopathy   Neurologic:   Cranial nerves 2 - 12 grossly intact, sensation intact, DTR       present and equal bilaterally       Results Review:    I reviewed the patient's new clinical results.    Assessment/Plan       Osteoarthritis of both knees    Essential hypertension    Benign prostatic hyperplasia      Plan is for left knee replacement    I discussed the patients findings and my recommendations with patient.     Manuel Suárez,  MD  11/19/21  13:09 EST

## 2021-12-06 VITALS — DIASTOLIC BLOOD PRESSURE: 83 MMHG | SYSTOLIC BLOOD PRESSURE: 136 MMHG

## 2021-12-06 NOTE — HOME HEALTH
OT EMMY AND JAIR COMPLETED THIS DATE. OT PROVIDED SKILLED INSTRUCTION ON HOME SAFETY, LE ADLS AND BED/BATH TRANSFERS USING AE/AT TRAINING.

## 2022-03-17 ENCOUNTER — TRANSCRIBE ORDERS (OUTPATIENT)
Dept: HOME HEALTH SERVICES | Facility: HOME HEALTHCARE | Age: 57
End: 2022-03-17

## 2022-03-17 ENCOUNTER — HOME HEALTH ADMISSION (OUTPATIENT)
Dept: HOME HEALTH SERVICES | Facility: HOME HEALTHCARE | Age: 57
End: 2022-03-17

## 2022-03-17 DIAGNOSIS — Z96.651 AFTERCARE FOLLOWING RIGHT KNEE JOINT REPLACEMENT SURGERY: Primary | ICD-10-CM

## 2022-03-17 DIAGNOSIS — Z47.1 AFTERCARE FOLLOWING RIGHT KNEE JOINT REPLACEMENT SURGERY: Primary | ICD-10-CM

## (undated) DEVICE — GLV SURG SENSICARE GREEN W/ALOE PF LF 8 STRL

## (undated) DEVICE — STRYKER PERFORMANCE SERIES SAGITTAL BLADE: Brand: STRYKER PERFORMANCE SERIES

## (undated) DEVICE — DEV NAV HIP/KN ORTHALIGNPLS 1P/U

## (undated) DEVICE — HANDPIECE SET WITH HIGH FLOW TIP AND SUCTION TUBE: Brand: INTERPULSE

## (undated) DEVICE — IRRISEPT WOUND DEBRIDMENT AND CLEANSING SYSTEM: Brand: IRRISEPT

## (undated) DEVICE — PK KN TOTL 20

## (undated) DEVICE — GLV SURG BIOGEL M LTX PF 8

## (undated) DEVICE — IMMOB KN 3PNL NLY/FIBR UNIV 16IN

## (undated) DEVICE — SLV SCD CALF HEMOFORCE DVT THERP REPROC MD

## (undated) DEVICE — TBG PENCL TELESCP MEGADYNE SMOKE EVAC 10FT

## (undated) DEVICE — SUT VIC 2/0 CT1 27IN J259H

## (undated) DEVICE — DRSNG SURG AQUACEL AG 9X30CM

## (undated) DEVICE — SYS SKIN CLS DERMABOND PRINEO W/22CM MESH TP

## (undated) DEVICE — SPNG LAP 18X18IN LF STRL PK/5

## (undated) DEVICE — DISPOSABLE TOURNIQUET CUFF SINGLE BLADDER, SINGLE PORT AND QUICK CONNECT CONNECTOR: Brand: COLOR CUFF

## (undated) DEVICE — FLEXIBLE YANKAUER,MEDIUM TIP, NO VACUUM CONTROL: Brand: ARGYLE

## (undated) DEVICE — BOWL AND CEMENT CARTRIDGE WITH BREAKAWAY FEMORAL NOZZLE AND MEDIUM PRESSURIZER: Brand: ACM

## (undated) DEVICE — DRAPE,U/ SHT,SPLIT,PLAS,STERIL: Brand: MEDLINE

## (undated) DEVICE — SUT VIC 0 CT 36IN J958H